# Patient Record
Sex: FEMALE | Race: WHITE
[De-identification: names, ages, dates, MRNs, and addresses within clinical notes are randomized per-mention and may not be internally consistent; named-entity substitution may affect disease eponyms.]

---

## 2017-01-23 ENCOUNTER — HOSPITAL ENCOUNTER (OUTPATIENT)
Dept: HOSPITAL 75 - RAD | Age: 62
End: 2017-01-23
Attending: NURSE PRACTITIONER
Payer: COMMERCIAL

## 2017-01-23 DIAGNOSIS — R10.33: Primary | ICD-10-CM

## 2017-01-23 PROCEDURE — 76705 ECHO EXAM OF ABDOMEN: CPT

## 2017-01-23 NOTE — XMS REPORT
Continuity of Care Document

 Created on: 2014



JUDIE CONKLIN

External Reference #: [id number]

: 1955

Sex: Female



Demographics







 Address  [address]

[place]  [address]

 

 Home Phone  [phone]

 

 Preferred Language  English

 

 Marital Status  Unknown

 

 Latter-day Affiliation  Unknown

 

 Race  Unknown

 

 Ethnic Group  Unknown





Author







 Author  [facility]

 

 Organization  [facility]

 

 Address  Unknown

 

 Phone  Unavailable







Support







 Name  Relationship  Address  Phone

 

 REJI PHILLIPS  Caregiver  [address] [place]  [address] [phone]

 

 TERRENCE TREVINO DO  Caregiver  [facility] EMERGENCY DEPT

[place]  [address] [phone]

 

 EBENEZER ROSAS MD  Caregiver  [address] [place]  [address] [phone]

 

 REGGIE MARTIN  Next Of Kin  [address]

[place]  [address] [phone]







Care Team Providers







 Care Team Member Name  Role  Phone

 

 EBENEZER ROSAS MD  PCP  [phone]







Insurance Providers







 Payer Name  Policy Number  Subscriber Name  Relationship

 

 Self Pay     Judie Conklin  18 Self / Same As Patient







Advance Directives







 Directive  Response  Recorded Date/Time

 

 Advance Directives  No  14 12:18pm

 

 Health Care Power of   No  14 12:18pm

 

 Organ Donor  No  14 12:18pm

 

 Resuscitation Status  Full Code  14 12:18pm







Problems

Medical Problems





 Problem  Onset Date  Status

 

 Lumbar radiculopathy  Unknown  Active







Medications







 Medication  Dose  Route  Sig  Days/Qty  Instructions  Order Date  Discontinued 
Date  Status

 

 Oxycodone HCl (Oxycodone IR)                 09  Discontinued

 

 Carisoprodol  350 Mg  PO  EVERY 4HRS PRN        09  
Discontinued

 

 Tolterodine Tartrate                 09  Discontinued

 

 Levothyroxine Sodium                 09  Discontinued

 

 Escitalopram Oxalate                 09  Discontinued

 

 Citalopram Hydrobromide  40 Mg  PO  DAILY        12  
Discontinued

 

 Solifenacin  10 Mg  PO  DAILY        12  Discontinued

 

 Hydrocodone Bit/Acetaminophen  7.5 - 500 Mg  PO  EVERY 4HRS PRN        13  Discontinued

 

 Hydrochlorothiazide  25 Mg  PO  DAILY        12     Active

 

 Meloxicam (Mobic)  15 Mg  PO  DAILY        12     Active

 

 Methylprednisolone  0  PO  AS DIRECTED  1 Qty     12  
Discontinued

 

 Tramadol HCl  50 Mg  PO  Q4-6HOURS PRN  20 Qty  FOR PAIN  12  
Discontinued

 

 Aclidinium Bromide  400 Mcg  IH  AS NEEDED        13  
Discontinued

 

 Gabapentin  300 Mg  PO  FOUR TIMES DAILY        13     Active

 

 Esomeprazole Magnesium  40 Mg  PO  DAILY        13     Active

 

 Black Cohosh Root Extract  80 Mg  PO  DAILY        13     Active

 

 Docusate Sodium  200 Mg  PO  DAILY        13     Active

 

 Cetirizine HCl  10 Mg  PO  DAILY        13     Active

 

 Escitalopram Oxalate  20 Mg  PO  DAILY        13     Active

 

 Bupropion HCl  150 Mg  PO  DAILY        13     Active

 

 Furosemide (Lasix)  40 Mg  PO  DAILY        13     Active

 

 Potassium Chloride  10 Meq  PO  DAILY WITH FOOD        13     Active

 

 [lipozene]  1,500 Mg  PO  DAILY        13  Discontinued

 

 Hydrocodone Bit/Acetaminophen  1 Each  PO  EVERY 4HRS PRN        13  Discontinued

 

 Dicyclomine HCl  1 Each  PO  BEFORE MEALS AND AT BEDTIME  20 Qty  FOR STOMACH 
DISCOMFORT  13  Discontinued

 

 Hyoscyamine Sulfate (Levsin)  1 - 2 Each  PO  Q4HR PRN  30 Qty     13  Discontinued

 

 Ondansetron HCl  4 Mg  PO  EVERY 4HRS  10 Qty  FOR NAUSEA AND VOMITING  13  Discontinued

 

 Solifenacin  10 Mg  PO  DAILY        13     Active

 

 Acetaminophen/Hydrocodone Bitart (Lortab)  7.5-500 Mg  PO  EVERY 4-6 HOURS PRN
        13     Active

 

 Aclidinium Bromide  400 Mcg  IH  DAILY PRN     AS NEEDED FOR SHORTNESS OF 
BREATH  13     Active

 

 Carisoprodol  350 Mg  PO  FOUR TIMES DAILY        13     Active

 

 Cefuroxime Axetil (Ceftin)  1 Each  PO  TWICE A DAY      TWICE DAILY  13  Discontinued

 

 Cyclobenzaprine Hcl  10 Mg  PO  EVERY 8HRS PRN SPASMS  10 Qty     14     
Active







Social History







 Social History Problem  Response  Recorded Date/Time

 

 Alcohol Use  Denies Use  2014 12:18pm

 

 Recreational Drug Use  No  2014 12:18pm

 

 Recent Foreign Travel  No  2014 12:18pm

 

 Recent Infectious Disease Exposure  No  2014 12:18pm

 

 Hospitalization with Isolation  Denies  2014 12:18pm

 

 Smoking Status  Current Everyday Smoker  2014 12:18pm









 Query  Response  Start Date  Stop Date

 

 Smoking Status  Current Everyday Smoker      







Hospital Discharge Instructions

No hospital discharge instructions.



Plan of Care

No plan of care.



Functional Status

No functional status results.



Allergies, Adverse Reactions, Alerts







 Allergen  Type  Severity  Reaction  Status  Last Updated

 

 tramadol HCl  Allergy  Unknown  RASH  Active  13

 

 diazepam ([id number])  Allergy  Unknown     Active  08

 

 morphine  Allergy  Intermediate  NAUSEA/VOMITTING/SYNCOPE  Active  12

 

 propoxyphene ([id number])  Allergy  Unknown     Active  08

 

 Pentazocine  Allergy  Mild     Active  09

 

 Aspirin  Allergy  Mild     Active  09

 

 methimazole ([id number])  Allergy  Unknown     Active  08

 

 prednisone ([id number])  Adverse Reaction        Active  13

 

 ketorolac ([id number])  Allergy  Mild     Active  09







Immunizations







 Name  Given  Type

 

 Hepatitis B  Yes  Historical

 

 Tetanus Booster (TDap)  Unknown  Historical







Vital Signs

Acute Vital Signs





 Vital  Response  Date/Time

 

 Temperature (Fahrenheit)  97.6 degrees F (97.6 - 99.5)   

 

 Temperature (Calculated Celsius)  36.85411 degrees C (36.4 - 37.5)   

 

 Temperature Source  Temporal     

 

 Pulse Rate (adult)  64 bpm (60 - 90)   

 

 Respiratory Rate  20 bpm (12 - 24)   

 

 O2 Sat by Pulse Oximetry  98 % (88 - 100)   

 

 Blood Pressure  146/102 mm Hg   

 

 Pain      

 

    Pain Intensity  10     

 

 Height (Feet)  5 feet    

 

 Height (Inches)  5 inches    

 

 Height (Calculated Centimeters)  165.812044 cm    

 

 Weight (Pounds)  176 pounds    

 

 Weight (Calculated Kilograms)  79.491857 kilograms    

 

 Calculated BMI  29.28     







Results







 Test  Source  Date  Result  Interp.  Ref. Range  Comments

 

 Alanine Aminotransferase (ALT/SGPT)     2013 5:40am  29 U/L  L  
30-65   

 

 Albumin     2013 5:40am  2.7 G/DL  L  3.4-5.0   

 

 Alkaline Phosphatase     2013 5:40am  108 U/L  N     

 

 Amylase Level     2013 5:40am  26 U/L  N     

 

 Aspartate Amino Transf (AST/SGOT)     2013 5:40am  10 U/L  L  15-
37   

 

 BUN/Creatinine Ratio     2013 5:40am  5     -   

 

 Band Neutrophils     2013 11:21am  0 %     -   

 

 Basophils # (Auto)     2013 5:40am  0.0 10^3/uL  N  0.0-0.1   

 

 Basophils % (Manual)     2013 11:21am  1 %     -   

 

 Basophils (%) (Auto)     2013 5:40am  0 %  N  0-10   

 

 Blood Urea Nitrogen     2013 5:40am  3 MG/DL  L  7-18   

 

 Calcium Level     2013 5:40am  8.7 MG/DL  N  8.5-10.1   

 

 Carbon Dioxide Level     2013 5:40am  25 MMOL/L  N  21-32   

 

 Chloride Level     2013 5:40am  102 MMOL/L  N  101-110   

 

 Creatine Kinase MB     2012 7:50am  0.2 NG/ML  N  0.0-3.6   

 

 Creatinine     2013 5:40am  0.6 MG/DL  N  0.6-1.3   

 

 Eosinophils # (Auto)     2013 5:40am  0.1 10^3/uL  N  0.0-0.3   

 

 Eosinophils % (Manual)     2013 1:01am  2 %     -   

 

 Eosinophils (%) (Auto)     2013 5:40am  1 %  N  0-10   

 

 Glucose Level     2013 5:40am  91 MG/DL  N     

 

 Hematocrit     2013 5:40am  32 %  L  35-52   

 

 Hemoglobin     2013 5:40am  10.7 G/DL  L  11.5-16.0   

 

 Hemoglobin A1c     2012 11:28am  5.4 %  N  4.5-6.2   

 

 Lipase     2013 5:40am  92 U/L  N     

 

 Lymphocytes # (Auto)     2013 5:40am  2.7 X 10^3  N  1.0-4.0   

 

 Lymphocytes % (Manual)     2013 1:01am  22 %     -   

 

 Lymphocytes (%) (Auto)     2013 5:40am  36 %  N  12-44   

 

 Magnesium Level     2008 12:30am  2.1 MG/DL  N  1.8-2.4  Has specimen 
been collected/obtained? YComments to Phlebotomist: Y

 

 Mean Corpuscular Hemoglobin     2013 5:40am  31 PG  N  25-34   

 

 Mean Corpuscular Hemoglobin Concent     2013 5:40am  33 G/DL  N  
32-36   

 

 Mean Corpuscular Volume     2013 5:40am  94 FL  N  80-99   

 

 Mean Platelet Volume     2013 5:40am  9.1 FL  N  7.4-10.4   

 

 Monocytes # (Auto)     2013 5:40am  0.6 X 10^3  N  0.0-1.0   

 

 Monocytes % (Manual)     2013 1:01am  2 %     -   

 

 Monocytes (%) (Auto)     2013 5:40am  8 %  N  0-12   

 

 Myoglobin     2008 12:30am  39 UG/L  N  10-92  Has specimen been 
collected/obtained? YComments to Phlebotomist: Y

 

 Neutrophils # (Auto)     2013 5:40am  4.0 X 10^3  N  1.8-7.8   

 

 Neutrophils % (Manual)     2013 1:01am  74 %     -   

 

 Neutrophils (%) (Auto)     2013 5:40am  54 %  N  42-75   

 

 Platelet Count     2013 5:40am  480 10^3/uL  H  130-400   

 

 Potassium Level     2013 5:40am  3.1 MMOL/L  L  3.6-5.0   

 

 Prothromb Time International Ratio     2012 11:15am  0.8  N  0.8-
1.4                       INTERPRETIVE DATASUGGESTED THERAPEUTIC RANGE FOR INR'S
:

   VENOUS THROMBOSIS, PULMONARY EMBOLISM, OR PREVENTION OF

   SYSTEMIC EMBOLISM (EG. IN ATRIAL FIBRILLATION):

                     2.0  -  3.0

   MECHANICAL PROSTHETIC HEART VALVES:

                     2.5  -  3.5*

*NOTE:  INR'S UP TO 4.5 MAY BE NECESSARY IN SELECTED GROUPS

        OF HIGH RISK PATIENTS.

[employer] CON[employer]ENS[employer]

CONFERENCE ON ANTITHROMBOTIC THERAPY (2000).

 

 Prothrombin Time     2012 11:15am  11.2 SEC  L  12.2-14.7   

 

 Red Blood Count     2013 5:40am  3.44 10^6/uL  L  4.35-5.85   

 

 Red Cell Distribution Width     2013 5:40am  13.1 %  N  10.0-
14.5   

 

 Sodium Level     2013 5:40am  137 MMOL/L  N  135-145   

 

 Thyroid Stimulating Hormone (TSH)     2013 11:21am  4.42 UIU/ML  N  
0.34-5.60   

 

 Total Bilirubin     2013 5:40am  0.2 MG/DL  N  0.0-1.0   

 

 Total Creatine Kinase     2012 7:50am  51 U/L  N  1-159   

 

 Total Protein     2013 5:40am  6.3 G/DL  L  6.4-8.2   

 

 Troponin I     2012 7:50am  < 0.10 NG/ML     0.00-0.10   

 

 Urine Bacteria     2013 1:54am  TRACE /HPF     -  Has specimen 
been collected/obtained? YSpecimen Description CLEAN CATCH

 

 Urine Bilirubin     2013 1:54am  NEGATIVE     -  Has specimen 
been collected/obtained? YSpecimen Description CLEAN CATCH

 

 Urine Casts     2013 1:54am  PRESENT /LPF  H  -  Has specimen 
been collected/obtained? YSpecimen Description CLEAN CATCH

 

 Urine Clarity     2013 1:54am  CLEAR     -  Has specimen been 
collected/obtained? YSpecimen Description CLEAN CATCH

 

 Urine Color     2013 1:54am  YELLOW     -  Has specimen been 
collected/obtained? YSpecimen Description CLEAN CATCH

 

 Urine Crystals     2013 1:54am  NONE /LPF     -  Has specimen 
been collected/obtained? YSpecimen Description CLEAN CATCH

 

 Urine Culture Indicated     2013 1:54am  YES     -  Has specimen 
been collected/obtained? YSpecimen Description CLEAN CATCH

 

 Urine Glucose (UA)     2013 1:54am  NEGATIVE     -  Has specimen 
been collected/obtained? YSpecimen Description CLEAN CATCH

 

 Urine Hyaline Casts     2013 1:54am  2-5 /LPF  H  -  Has 
specimen been collected/obtained? YSpecimen Description CLEAN CATCH

 

 Urine Ketones     2013 1:54am  NEGATIVE     -  Has specimen been 
collected/obtained? YSpecimen Description CLEAN CATCH

 

 Urine Leukocyte Esterase     2013 1:54am  2+  H  -  Has specimen 
been collected/obtained? YSpecimen Description CLEAN CATCH

 

 Urine Mucus     2013 1:54am  NEGATIVE /LPF     -  Has specimen 
been collected/obtained? YSpecimen Description CLEAN CATCH

 

 Urine Nitrite     2013 1:54am  NEGATIVE     -  Has specimen been 
collected/obtained? YSpecimen Description CLEAN CATCH

 

 Urine Protein     2013 1:54am  1+  H  -  Has specimen been 
collected/obtained? YSpecimen Description CLEAN CATCH

 

 Urine RBC     2013 1:54am  NONE /HPF     -  Has specimen been 
collected/obtained? YSpecimen Description CLEAN CATCH

 

 Urine Specific Gravity     2013 1:54am  1.015  L  -  Has 
specimen been collected/obtained? YSpecimen Description CLEAN CATCH

 

 Urine Squamous Epithelial Cells     2013 1:54am  RARE /HPF     -
  Has specimen been collected/obtained? YSpecimen Description CLEAN CATCH

 

 Urine Urobilinogen     2013 1:54am  NORMAL MG/DL     -  Has 
specimen been collected/obtained? YSpecimen Description CLEAN CATCH

 

 Urine WBC     2013 1:54am  10-25 /HPF  H  -  Has specimen been 
collected/obtained? YSpecimen Description CLEAN CATCH

 

 Urine pH     2013 1:54am  5     -  Has specimen been collected/
obtained? YSpecimen Description CLEAN CATCH

 

 White Blood Count     2013 5:40am  7.4 10^3/uL  N  4.3-11.0   

 

 Pathology Consult Specimen     2008 12:30am  See report     -  Has 
specimen been collected/obtained? YComments to Phlebotomist: Y

 

 Lab Scanned Report     2008 10:55am  Referred Lab Report [id number]     
-   

 

 Estimat Glomerular Filtration Rate     2013 1:01am  > 60     -  
                  GFR INTERPRETIVE DATA

         UNITS FOR ESTIMATED GFR (eGFR): mL/min/1.73 M2

         REFERENCE RANGE FOR ESTIMATED GFR (eGFR)

                                          eGFR

         NORMAL eGFR                       >60

         MODERATELY DECREASED eGFR          30-59

         SEVERLY DECREASED eGFR             15-29

         KIDNEY FAILURE                    <15 (OR DIALYSIS)

 

 Blood Morphology Comment     2013 1:01am  NORMAL     -   

 

 Creatine Kinase     2008 12:55pm  48 mg/dl  N    Has specimen 
been collected/obtained? Y

 

 Urine RBC (Auto)     2013 1:54am  NEGATIVE     -  Has specimen 
been collected/obtained? YSpecimen Description CLEAN CATCH

 

 Urine Culture  Urine-Clean Catch  2013 1:54am  Group B 
Streptococci         







Procedures

No known history of procedures.



Encounters







 Encounter  Location  Date/Time

 

 Registered Emergency Room  [facility]  14 12:08pm











 Recent Diagnosis

## 2017-01-23 NOTE — DIAGNOSTIC IMAGING REPORT
PROCEDURE: US Abdomen, limited.



TECHNIQUE: Multiple realtime grayscale images were obtained over

the abdomen in various projections.



INDICATION: Pain superior to umbilicus.



FINDINGS: There are no previous ultrasound examinations

available for comparison. The CT of the abdomen and pelvis exam

performed on 08/03/2016 noted a small fat-containing

retro-umbilical hernia. There is also thinning of the anterior

abdominal wall near midline just superior to the umbilicus. On

this study, there does appear to be a 3.2 x 1.5 cm area of

altered signal along the anterior abdominal wall 4 cm superior

and 2 cm to the right of the umbilicus. Whether this is

secondary to a small portion of the bowel extending through a

defect in the wall or whether this is related to mesenteric fat

alone is not certain. If further study is desired, then a repeat

CT abdomen/pelvis exam would be recommended.



IMPRESSION:



There is a small defect in the anterior abdominal wall just

superior and to the right of the umbilicus. There may either be

a segment of bowel extending through the defect or portion of

the mesenteric fat. CT of the abdomen and pelvis would be

recommended for further evaluation.



Dictated by:



Dictated on workstation # [id number]

## 2017-01-27 ENCOUNTER — HOSPITAL ENCOUNTER (OUTPATIENT)
Dept: HOSPITAL 75 - RAD | Age: 62
End: 2017-01-27
Attending: NURSE PRACTITIONER
Payer: COMMERCIAL

## 2017-01-27 DIAGNOSIS — K43.2: Primary | ICD-10-CM

## 2017-01-27 PROCEDURE — 74176 CT ABD & PELVIS W/O CONTRAST: CPT

## 2017-01-27 NOTE — XMS REPORT
Continuity of Care Document

 Created on: 2014



JUDIE CONKLIN

External Reference #: [id number]

: 1955

Sex: Female



Demographics







 Address  [address]

[place]  [address]

 

 Home Phone  [phone]

 

 Preferred Language  English

 

 Marital Status  Unknown

 

 Church Affiliation  Unknown

 

 Race  Unknown

 

 Ethnic Group  Unknown





Author







 Author  [facility]

 

 Organization  [facility]

 

 Address  Unknown

 

 Phone  Unavailable







Support







 Name  Relationship  Address  Phone

 

 REJI PHILLIPS  Caregiver  [address] [place]  [address] [phone]

 

 TERRENCE TREVINO DO  Caregiver  [facility] EMERGENCY DEPT

[place]  [address] [phone]

 

 EBENEZER ROSAS MD  Caregiver  [address] [place]  [address] [phone]

 

 REGGIE MARTIN  Next Of Kin  [address]

[place]  [address] [phone]







Care Team Providers







 Care Team Member Name  Role  Phone

 

 EBENEZER ROSAS MD  PCP  [phone]







Insurance Providers







 Payer Name  Policy Number  Subscriber Name  Relationship

 

 Self Pay     Judie Conklin  18 Self / Same As Patient







Advance Directives







 Directive  Response  Recorded Date/Time

 

 Advance Directives  No  14 12:18pm

 

 Health Care Power of   No  14 12:18pm

 

 Organ Donor  No  14 12:18pm

 

 Resuscitation Status  Full Code  14 12:18pm







Problems

Medical Problems





 Problem  Onset Date  Status

 

 Lumbar radiculopathy  Unknown  Active







Medications







 Medication  Dose  Route  Sig  Days/Qty  Instructions  Order Date  Discontinued 
Date  Status

 

 Oxycodone HCl (Oxycodone IR)                 09  Discontinued

 

 Carisoprodol  350 Mg  PO  EVERY 4HRS PRN        09  
Discontinued

 

 Tolterodine Tartrate                 09  Discontinued

 

 Levothyroxine Sodium                 09  Discontinued

 

 Escitalopram Oxalate                 09  Discontinued

 

 Citalopram Hydrobromide  40 Mg  PO  DAILY        12  
Discontinued

 

 Solifenacin  10 Mg  PO  DAILY        12  Discontinued

 

 Hydrocodone Bit/Acetaminophen  7.5 - 500 Mg  PO  EVERY 4HRS PRN        13  Discontinued

 

 Hydrochlorothiazide  25 Mg  PO  DAILY        12     Active

 

 Meloxicam (Mobic)  15 Mg  PO  DAILY        12     Active

 

 Methylprednisolone  0  PO  AS DIRECTED  1 Qty     12  
Discontinued

 

 Tramadol HCl  50 Mg  PO  Q4-6HOURS PRN  20 Qty  FOR PAIN  12  
Discontinued

 

 Aclidinium Bromide  400 Mcg  IH  AS NEEDED        13  
Discontinued

 

 Gabapentin  300 Mg  PO  FOUR TIMES DAILY        13     Active

 

 Esomeprazole Magnesium  40 Mg  PO  DAILY        13     Active

 

 Black Cohosh Root Extract  80 Mg  PO  DAILY        13     Active

 

 Docusate Sodium  200 Mg  PO  DAILY        13     Active

 

 Cetirizine HCl  10 Mg  PO  DAILY        13     Active

 

 Escitalopram Oxalate  20 Mg  PO  DAILY        13     Active

 

 Bupropion HCl  150 Mg  PO  DAILY        13     Active

 

 Furosemide (Lasix)  40 Mg  PO  DAILY        13     Active

 

 Potassium Chloride  10 Meq  PO  DAILY WITH FOOD        13     Active

 

 [lipozene]  1,500 Mg  PO  DAILY        13  Discontinued

 

 Hydrocodone Bit/Acetaminophen  1 Each  PO  EVERY 4HRS PRN        13  Discontinued

 

 Dicyclomine HCl  1 Each  PO  BEFORE MEALS AND AT BEDTIME  20 Qty  FOR STOMACH 
DISCOMFORT  13  Discontinued

 

 Hyoscyamine Sulfate (Levsin)  1 - 2 Each  PO  Q4HR PRN  30 Qty     13  Discontinued

 

 Ondansetron HCl  4 Mg  PO  EVERY 4HRS  10 Qty  FOR NAUSEA AND VOMITING  13  Discontinued

 

 Solifenacin  10 Mg  PO  DAILY        13     Active

 

 Acetaminophen/Hydrocodone Bitart (Lortab)  7.5-500 Mg  PO  EVERY 4-6 HOURS PRN
        13     Active

 

 Aclidinium Bromide  400 Mcg  IH  DAILY PRN     AS NEEDED FOR SHORTNESS OF 
BREATH  13     Active

 

 Carisoprodol  350 Mg  PO  FOUR TIMES DAILY        13     Active

 

 Cefuroxime Axetil (Ceftin)  1 Each  PO  TWICE A DAY      TWICE DAILY  13  Discontinued

 

 Cyclobenzaprine Hcl  10 Mg  PO  EVERY 8HRS PRN SPASMS  10 Qty     14     
Active







Social History







 Social History Problem  Response  Recorded Date/Time

 

 Alcohol Use  Denies Use  2014 12:18pm

 

 Recreational Drug Use  No  2014 12:18pm

 

 Recent Foreign Travel  No  2014 12:18pm

 

 Recent Infectious Disease Exposure  No  2014 12:18pm

 

 Hospitalization with Isolation  Denies  2014 12:18pm

 

 Smoking Status  Current Everyday Smoker  2014 12:18pm









 Query  Response  Start Date  Stop Date

 

 Smoking Status  Current Everyday Smoker      







Hospital Discharge Instructions

No hospital discharge instructions.



Plan of Care

No plan of care.



Functional Status

No functional status results.



Allergies, Adverse Reactions, Alerts







 Allergen  Type  Severity  Reaction  Status  Last Updated

 

 tramadol HCl  Allergy  Unknown  RASH  Active  13

 

 diazepam (I816879760)  Allergy  Unknown     Active  08

 

 morphine  Allergy  Intermediate  NAUSEA/VOMITTING/SYNCOPE  Active  12

 

 propoxyphene (L523946083)  Allergy  Unknown     Active  08

 

 Pentazocine  Allergy  Mild     Active  09

 

 Aspirin  Allergy  Mild     Active  09

 

 methimazole ([id number])  Allergy  Unknown     Active  08

 

 prednisone ([id number])  Adverse Reaction        Active  13

 

 ketorolac ([id number])  Allergy  Mild     Active  09







Immunizations







 Name  Given  Type

 

 Hepatitis B  Yes  Historical

 

 Tetanus Booster (TDap)  Unknown  Historical







Vital Signs

Acute Vital Signs





 Vital  Response  Date/Time

 

 Temperature (Fahrenheit)  97.6 degrees F (97.6 - 99.5)   

 

 Temperature (Calculated Celsius)  36.88180 degrees C (36.4 - 37.5)   

 

 Temperature Source  Temporal     

 

 Pulse Rate (adult)  64 bpm (60 - 90)   

 

 Respiratory Rate  20 bpm (12 - 24)   

 

 O2 Sat by Pulse Oximetry  98 % (88 - 100)   

 

 Blood Pressure  146/102 mm Hg   

 

 Pain      

 

    Pain Intensity  10     

 

 Height (Feet)  5 feet    

 

 Height (Inches)  5 inches    

 

 Height (Calculated Centimeters)  165.074905 cm    

 

 Weight (Pounds)  176 pounds    

 

 Weight (Calculated Kilograms)  79.300902 kilograms    

 

 Calculated BMI  29.28     







Results







 Test  Source  Date  Result  Interp.  Ref. Range  Comments

 

 Alanine Aminotransferase (ALT/SGPT)     2013 5:40am  29 U/L  L  
30-65   

 

 Albumin     2013 5:40am  2.7 G/DL  L  3.4-5.0   

 

 Alkaline Phosphatase     2013 5:40am  108 U/L  N     

 

 Amylase Level     2013 5:40am  26 U/L  N     

 

 Aspartate Amino Transf (AST/SGOT)     2013 5:40am  10 U/L  L  15-
37   

 

 BUN/Creatinine Ratio     2013 5:40am  5     -   

 

 Band Neutrophils     2013 11:21am  0 %     -   

 

 Basophils # (Auto)     2013 5:40am  0.0 10^3/uL  N  0.0-0.1   

 

 Basophils % (Manual)     2013 11:21am  1 %     -   

 

 Basophils (%) (Auto)     2013 5:40am  0 %  N  0-10   

 

 Blood Urea Nitrogen     2013 5:40am  3 MG/DL  L  7-18   

 

 Calcium Level     2013 5:40am  8.7 MG/DL  N  8.5-10.1   

 

 Carbon Dioxide Level     2013 5:40am  25 MMOL/L  N  21-32   

 

 Chloride Level     2013 5:40am  102 MMOL/L  N  101-110   

 

 Creatine Kinase MB     2012 7:50am  0.2 NG/ML  N  0.0-3.6   

 

 Creatinine     2013 5:40am  0.6 MG/DL  N  0.6-1.3   

 

 Eosinophils # (Auto)     2013 5:40am  0.1 10^3/uL  N  0.0-0.3   

 

 Eosinophils % (Manual)     2013 1:01am  2 %     -   

 

 Eosinophils (%) (Auto)     2013 5:40am  1 %  N  0-10   

 

 Glucose Level     2013 5:40am  91 MG/DL  N     

 

 Hematocrit     2013 5:40am  32 %  L  35-52   

 

 Hemoglobin     2013 5:40am  10.7 G/DL  L  11.5-16.0   

 

 Hemoglobin A1c     2012 11:28am  5.4 %  N  4.5-6.2   

 

 Lipase     2013 5:40am  92 U/L  N     

 

 Lymphocytes # (Auto)     2013 5:40am  2.7 X 10^3  N  1.0-4.0   

 

 Lymphocytes % (Manual)     2013 1:01am  22 %     -   

 

 Lymphocytes (%) (Auto)     2013 5:40am  36 %  N  12-44   

 

 Magnesium Level     2008 12:30am  2.1 MG/DL  N  1.8-2.4  Has specimen 
been collected/obtained? YComments to Phlebotomist: Y

 

 Mean Corpuscular Hemoglobin     2013 5:40am  31 PG  N  25-34   

 

 Mean Corpuscular Hemoglobin Concent     2013 5:40am  33 G/DL  N  
32-36   

 

 Mean Corpuscular Volume     2013 5:40am  94 FL  N  80-99   

 

 Mean Platelet Volume     2013 5:40am  9.1 FL  N  7.4-10.4   

 

 Monocytes # (Auto)     2013 5:40am  0.6 X 10^3  N  0.0-1.0   

 

 Monocytes % (Manual)     2013 1:01am  2 %     -   

 

 Monocytes (%) (Auto)     2013 5:40am  8 %  N  0-12   

 

 Myoglobin     2008 12:30am  39 UG/L  N  10-92  Has specimen been 
collected/obtained? YComments to Phlebotomist: Y

 

 Neutrophils # (Auto)     2013 5:40am  4.0 X 10^3  N  1.8-7.8   

 

 Neutrophils % (Manual)     2013 1:01am  74 %     -   

 

 Neutrophils (%) (Auto)     2013 5:40am  54 %  N  42-75   

 

 Platelet Count     2013 5:40am  480 10^3/uL  H  130-400   

 

 Potassium Level     2013 5:40am  3.1 MMOL/L  L  3.6-5.0   

 

 Prothromb Time International Ratio     2012 11:15am  0.8  N  0.8-
1.4                       INTERPRETIVE DATASUGGESTED THERAPEUTIC RANGE FOR INR'S
:

   VENOUS THROMBOSIS, PULMONARY EMBOLISM, OR PREVENTION OF

   SYSTEMIC EMBOLISM (EG. IN ATRIAL FIBRILLATION):

                     2.0  -  3.0

   MECHANICAL PROSTHETIC HEART VALVES:

                     2.5  -  3.5*

*NOTE:  INR'S UP TO 4.5 MAY BE NECESSARY IN SELECTED GROUPS

        OF HIGH RISK PATIENTS.

[employer] CONSENSUS

CONFERENCE ON ANTITHROMBOTIC THERAPY (2000).

 

 Prothrombin Time     2012 11:15am  11.2 SEC  L  12.2-14.7   

 

 Red Blood Count     2013 5:40am  3.44 10^6/uL  L  4.35-5.85   

 

 Red Cell Distribution Width     2013 5:40am  13.1 %  N  10.0-
14.5   

 

 Sodium Level     2013 5:40am  137 MMOL/L  N  135-145   

 

 Thyroid Stimulating Hormone (TSH)     2013 11:21am  4.42 UIU/ML  N  
0.34-5.60   

 

 Total Bilirubin     2013 5:40am  0.2 MG/DL  N  0.0-1.0   

 

 Total Creatine Kinase     2012 7:50am  51 U/L  N  1-159   

 

 Total Protein     2013 5:40am  6.3 G/DL  L  6.4-8.2   

 

 Troponin I     2012 7:50am  < 0.10 NG/ML     0.00-0.10   

 

 Urine Bacteria     2013 1:54am  TRACE /HPF     -  Has specimen 
been collected/obtained? YSpecimen Description CLEAN CATCH

 

 Urine Bilirubin     2013 1:54am  NEGATIVE     -  Has specimen 
been collected/obtained? YSpecimen Description CLEAN CATCH

 

 Urine Casts     2013 1:54am  PRESENT /LPF  H  -  Has specimen 
been collected/obtained? YSpecimen Description CLEAN CATCH

 

 Urine Clarity     2013 1:54am  CLEAR     -  Has specimen been 
collected/obtained? YSpecimen Description CLEAN CATCH

 

 Urine Color     2013 1:54am  YELLOW     -  Has specimen been 
collected/obtained? YSpecimen Description CLEAN CATCH

 

 Urine Crystals     2013 1:54am  NONE /LPF     -  Has specimen 
been collected/obtained? YSpecimen Description CLEAN CATCH

 

 Urine Culture Indicated     2013 1:54am  YES     -  Has specimen 
been collected/obtained? YSpecimen Description CLEAN CATCH

 

 Urine Glucose (UA)     2013 1:54am  NEGATIVE     -  Has specimen 
been collected/obtained? YSpecimen Description CLEAN CATCH

 

 Urine Hyaline Casts     2013 1:54am  2-5 /LPF  H  -  Has 
specimen been collected/obtained? YSpecimen Description CLEAN CATCH

 

 Urine Ketones     2013 1:54am  NEGATIVE     -  Has specimen been 
collected/obtained? YSpecimen Description CLEAN CATCH

 

 Urine Leukocyte Esterase     2013 1:54am  2+  H  -  Has specimen 
been collected/obtained? YSpecimen Description CLEAN CATCH

 

 Urine Mucus     2013 1:54am  NEGATIVE /LPF     -  Has specimen 
been collected/obtained? YSpecimen Description CLEAN CATCH

 

 Urine Nitrite     2013 1:54am  NEGATIVE     -  Has specimen been 
collected/obtained? YSpecimen Description CLEAN CATCH

 

 Urine Protein     2013 1:54am  1+  H  -  Has specimen been 
collected/obtained? YSpecimen Description CLEAN CATCH

 

 Urine RBC     2013 1:54am  NONE /HPF     -  Has specimen been 
collected/obtained? YSpecimen Description CLEAN CATCH

 

 Urine Specific Gravity     2013 1:54am  1.015  L  -  Has 
specimen been collected/obtained? YSpecimen Description CLEAN CATCH

 

 Urine Squamous Epithelial Cells     2013 1:54am  RARE /HPF     -
  Has specimen been collected/obtained? YSpecimen Description CLEAN CATCH

 

 Urine Urobilinogen     2013 1:54am  NORMAL MG/DL     -  Has 
specimen been collected/obtained? YSpecimen Description CLEAN CATCH

 

 Urine WBC     2013 1:54am  10-25 /HPF  H  -  Has specimen been 
collected/obtained? YSpecimen Description CLEAN CATCH

 

 Urine pH     2013 1:54am  5     -  Has specimen been collected/
obtained? YSpecimen Description CLEAN CATCH

 

 White Blood Count     2013 5:40am  7.4 10^3/uL  N  4.3-11.0   

 

 Pathology Consult Specimen     2008 12:30am  See report     -  Has 
specimen been collected/obtained? YComments to Phlebotomist: Y

 

 Lab Scanned Report     2008 10:55am  Referred Lab Report [id number]     
-   

 

 Estimat Glomerular Filtration Rate     2013 1:01am  > 60     -  
                  GFR INTERPRETIVE DATA

         UNITS FOR ESTIMATED GFR (eGFR): mL/min/1.73 M2

         REFERENCE RANGE FOR ESTIMATED GFR (eGFR)

                                          eGFR

         NORMAL eGFR                       >60

         MODERATELY DECREASED eGFR          30-59

         SEVERLY DECREASED eGFR             15-29

         KIDNEY FAILURE                    <15 (OR DIALYSIS)

 

 Blood Morphology Comment     2013 1:01am  NORMAL     -   

 

 Creatine Kinase     2008 12:55pm  48 mg/dl  N    Has specimen 
been collected/obtained? Y

 

 Urine RBC (Auto)     2013 1:54am  NEGATIVE     -  Has specimen 
been collected/obtained? YSpecimen Description CLEAN CATCH

 

 Urine Culture  Urine-Clean Catch  2013 1:54am  Group B 
Streptococci         







Procedures

No known history of procedures.



Encounters







 Encounter  Location  Date/Time

 

 Registered Emergency Room  [facility]  14 12:08pm











 Recent Diagnosis

## 2017-01-27 NOTE — DIAGNOSTIC IMAGING REPORT
PROCEDURE: CT abdomen and pelvis without contrast.



TECHNIQUE: Multiple contiguous axial images were obtained

through the abdomen and pelvis without the use of intravenous

contrast.



INDICATION: History of partial gastric resection for bleeding

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 Pain at incisional site in midabdomen. History

of hernia, gallbladder, appendectomy and hysterectomy.



FINDINGS: Midline incision is noted. There is a small incisional

ventral hernia present. This contains abdominal fat with no

bowel. This is along the superior margin of the incision. There

is also noted infiltrating appearance to the mesenteric fat

along the inferior margin of the incision left of midline. The

adjacent small bowel appears normal. There are no loculated

fluid collections. The stomach shows antrectomy. Stomach is

filled with fluid. There is fluid in the duodenum and the small

bowel. No evidence of bowel obstruction. The colon is

fluid-filled with very little solid stool present. No evidence

of diverticulitis. There is no free air or free fluid. The

bladder is empty. No intra-abdominal adenopathy. The aorta is

atherosclerotic without evidence of aneurysm. The liver appears

normal. The gallbladder is absent. Bile ducts are not dilated.

The pancreas and spleen appear normal. The adrenal glands are

normal. The kidneys appear normal without obstruction or

calculi.



IMPRESSION:

1. There is a small incisional hernia along the midline in the

upper region of the previous abdominal incision.

2. There is infiltrative appearance to the mesenteric fat in the

midabdomen left of midline. This is of uncertain etiology. This

is at the level of the umbilicus. With patient's symptoms, this

may represent an inflammatory process. There are no loculated

fluid collections.

3. The stomach, small bowel, and colon are fluid-filled with no

evidence of obstruction. There is very little solid stool

present.



Dictated by:



Dictated on workstation # [id number]

## 2017-01-28 ENCOUNTER — HOSPITAL ENCOUNTER (EMERGENCY)
Dept: HOSPITAL 75 - ER | Age: 62
Discharge: HOME | End: 2017-01-28
Payer: COMMERCIAL

## 2017-01-28 VITALS — SYSTOLIC BLOOD PRESSURE: 103 MMHG | DIASTOLIC BLOOD PRESSURE: 68 MMHG

## 2017-01-28 VITALS — WEIGHT: 155 LBS | BODY MASS INDEX: 25.83 KG/M2 | HEIGHT: 65 IN

## 2017-01-28 DIAGNOSIS — F17.210: ICD-10-CM

## 2017-01-28 DIAGNOSIS — N39.0: ICD-10-CM

## 2017-01-28 DIAGNOSIS — E87.8: Primary | ICD-10-CM

## 2017-01-28 DIAGNOSIS — I10: ICD-10-CM

## 2017-01-28 DIAGNOSIS — Z79.899: ICD-10-CM

## 2017-01-28 DIAGNOSIS — Z79.82: ICD-10-CM

## 2017-01-28 DIAGNOSIS — R11.2: ICD-10-CM

## 2017-01-28 LAB
ALBUMIN SERPL-MCNC: 4.5 G/DL (ref 3.2–4.5)
ALT SERPL-CCNC: 9 U/L (ref 0–55)
ANION GAP SERPL CALC-SCNC: 14 MMOL/L (ref 5–14)
AST SERPL-CCNC: 8 U/L (ref 5–34)
BASOPHILS # BLD AUTO: 0 10^3/UL (ref 0–0.1)
BASOPHILS NFR BLD AUTO: 0 % (ref 0–10)
BILIRUB SERPL-MCNC: 0.6 MG/DL (ref 0.1–1)
BILIRUB UR QL STRIP: (no result)
BUN SERPL-MCNC: 15 MG/DL (ref 7–18)
BUN/CREAT SERPL: 15
CALCIUM SERPL-MCNC: 9.3 MG/DL (ref 8.5–10.1)
CHLORIDE SERPL-SCNC: 101 MMOL/L (ref 98–107)
CO2 SERPL-SCNC: 22 MMOL/L (ref 21–32)
CREAT SERPL-MCNC: 1.03 MG/DL (ref 0.6–1.3)
EOSINOPHIL # BLD AUTO: 0.6 10^3/UL (ref 0–0.3)
EOSINOPHIL NFR BLD AUTO: 8 % (ref 0–10)
ERYTHROCYTE [DISTWIDTH] IN BLOOD BY AUTOMATED COUNT: 17.4 % (ref 10–14.5)
GFR SERPLBLD BASED ON 1.73 SQ M-ARVRAT: 55 ML/MIN
GLUCOSE SERPL-MCNC: 107 MG/DL (ref 70–105)
KETONES UR QL STRIP: NEGATIVE
LEUKOCYTE ESTERASE UR QL STRIP: (no result)
LIPASE SERPL-CCNC: 6 U/L (ref 8–78)
LYMPHOCYTES # BLD AUTO: 0.8 X 10^3 (ref 1–4)
LYMPHOCYTES NFR BLD AUTO: 10 % (ref 12–44)
MCH RBC QN AUTO: 26 PG (ref 25–34)
MCHC RBC AUTO-ENTMCNC: 33 G/DL (ref 32–36)
MCV RBC AUTO: 80 FL (ref 80–99)
MONOCYTES # BLD AUTO: 0.7 X 10^3 (ref 0–1)
MONOCYTES NFR BLD AUTO: 9 % (ref 0–12)
NEUTROPHILS # BLD AUTO: 5.7 X 10^3 (ref 1.8–7.8)
NEUTROPHILS NFR BLD AUTO: 73 % (ref 42–75)
NITRITE UR QL STRIP: NEGATIVE
PH UR STRIP: 6 [PH] (ref 5–9)
PLATELET # BLD: 458 10^3/UL (ref 130–400)
PMV BLD AUTO: 9.5 FL (ref 7.4–10.4)
POTASSIUM SERPL-SCNC: 2.9 MMOL/L (ref 3.6–5)
PROT SERPL-MCNC: 7.3 G/DL (ref 6.4–8.2)
PROT UR QL STRIP: (no result)
RBC # BLD AUTO: 4.83 10^6/UL (ref 4.35–5.85)
SODIUM SERPL-SCNC: 137 MMOL/L (ref 135–145)
SP GR UR STRIP: 1.01 (ref 1.02–1.02)
SQUAMOUS #/AREA URNS HPF: (no result) /HPF
UROBILINOGEN UR-MCNC: NORMAL MG/DL
WBC # BLD AUTO: 7.7 10^3/UL (ref 4.3–11)
WBC #/AREA URNS HPF: (no result) /HPF

## 2017-01-28 PROCEDURE — 96361 HYDRATE IV INFUSION ADD-ON: CPT

## 2017-01-28 PROCEDURE — 83690 ASSAY OF LIPASE: CPT

## 2017-01-28 PROCEDURE — 80053 COMPREHEN METABOLIC PANEL: CPT

## 2017-01-28 PROCEDURE — 96375 TX/PRO/DX INJ NEW DRUG ADDON: CPT

## 2017-01-28 PROCEDURE — 81000 URINALYSIS NONAUTO W/SCOPE: CPT

## 2017-01-28 PROCEDURE — 85025 COMPLETE CBC W/AUTO DIFF WBC: CPT

## 2017-01-28 PROCEDURE — 87088 URINE BACTERIA CULTURE: CPT

## 2017-01-28 PROCEDURE — 96374 THER/PROPH/DIAG INJ IV PUSH: CPT

## 2017-01-28 PROCEDURE — 36415 COLL VENOUS BLD VENIPUNCTURE: CPT

## 2017-01-28 NOTE — ED ABDOMINAL PAIN
General


Chief Complaint:  Abdominal/GI Problems


Stated Complaint:  VOMITING/DIARRHEA


Nursing Triage Note:  


AMB TO ED C/O VOMITING DIARRHEA SAW HER DR ON MONDAY MARIO ORDERED AND HAD OP CT 


YESTERDAY NO RESULTS KNOWN.


Sepsis Screen:  No Definite Risk


Source of Information:  Patient


Exam Limitations:  No Limitations





History of Present Illness


Time Seen By Provider:  16:22


Initial Comments


To ER with midline upper abdominal pain for the past 3 days.  She had 

outpatient abdomen ultrasound performed followed by outpatient abdomen pelvis 

CT scan.  These were done yesterday.  The CT scan showed some nonspecific 

inflammation to the left of midline in the mesentery.  She denies fevers.  She 

does report large amounts of watery diarrhea without blood or mucus.  She does 

report vomiting.


Timing/Duration:  1-2 Days


Severity/Quality:  Moderate


Location:  Epigastric


Radiation:  No Radiation


Activities at Onset:  None


Associated Symptoms:   Nausea/Vomiting





Allergies and Home Medications


Allergies


Coded Allergies:  


     morphine (Verified  Allergy, Intermediate, NAUSEA/VOMITTING/SYNCOPE, 5/30/ 12)


     aspirin (Unverified  Allergy, Mild, 5/28/09)


     ketorolac (Unverified  Allergy, Mild, 5/28/09)


     pentazocine (Unverified  Allergy, Mild, 5/28/09)


     diazepam (Verified  Allergy, Unknown, 6/22/08)


     duloxetine (Unverified  Allergy, Unknown, HIVES AND BEHAVIOR EPISODES, 10/7

/14)


     methimazole (Verified  Allergy, Unknown, 6/22/08)


     nalbuphine (Unverified  Allergy, Unknown, HIVES, 10/7/14)


     propoxyphene (Verified  Allergy, Unknown, 6/22/08)


     topiramate (Unverified  Allergy, Unknown, MOUTH SWELLING AND SORES, 10/7/14

)


     tramadol HCl (Unverified  Allergy, Unknown, RASH, 9/3/13)


     prednisone (Unverified  Adverse Reaction, Unknown, 10/7/14)


 seizure





Home Medications


Alprazolam 0.5 Mg Tablet 0.5 MG PO TID PRN PRN ANXIETY (Reported) 


   LAST FILLED #30 11-23-15 


Aspirin/Acetaminophen/Caffeine 1 Tab Tablet 1 TAB PO BID PRN PRN HEADACHE (

Reported) 


Black Cohosh Root Extract 80 Mg Capsule 80 MG PO DAILY (Reported) 


Bupropion HCl 150 Mg Tablet.er 150 MG PO DAILY (Reported) 


Cetirizine Hcl 10 Mg Tablet 10 MG PO DAILY (Reported) 


Ciprofloxacin HCl 500 Mg Tablet #14 500 MG PO BID 


   Prescribed by: ABISAI MOORE on 8/16/16 1152


Ciprofloxacin HCl 500 Mg Tablet #10 500 MG PO BID 


   Prescribed by: JEAN PAUL JARRELL on 1/28/17 1712


Cranberry Conc/Ascorbic Acid 1 Each Capsule 2 CAP PO DAILY (Reported) 


Docusate Sodium 100 Mg Capsule 200 MG PO DAILY PRN PRN CONSTIPATION (Reported) 


   TAKES 2 (100MG) CAPSULES DAILY 


Escitalopram Oxalate 20 Mg Tablet 20 MG PO DAILY (Reported) 


Gabapentin 300 Mg Capsule 600 MG PO DAILY (Reported) 


   TAKES 2 (300MG) CAPSULES 


Hydrochlorothiazide 25 Mg Tablet 25 MG PO DAILY (Reported) 


Hydrocodone/Acetaminophen 1 Each Tablet 1 TAB PO BID PRN PRN PAIN (Reported) 


Hydrocodone/Acetaminophen 1 Each Tablet #40 1-2 EACH PO Q4H 


   Prescribed by: ABISAI MOORE on 8/16/16 1152


Levothyroxine Sodium 25 Mcg Tablet #30 (Reported) 


Meloxicam 15 Mg Tablet 15 MG PO DAILY (Reported) 


Metaxalone 800 Mg Tablet 800 MG PO TID PRN PRN MUSCLE SPASMS (Reported) 


Ondansetron 8 Mg Tab.rapdis #10 8 MG PO Q6H PRN PRN NAUSEA/VOMITING 


   Prescribed by: JEAN PAUL JARRELL on 1/28/17 1712


Pantoprazole Sodium 40 Mg Tablet.dr #90 40 MG PO DAILY 


   Prescribed by: ABISAI MOORE on 8/16/16 1155


Potassium Chloride 10 Meq Tablet.er 20 MEQ PO DAILY (Reported) 


   TAKES 2 (10 MEQ) TABLETS 


Tolterodine Tartrate 2 Mg Tablet 4 MG PO DAILY (Reported) 


   TAKES 2 (2MG) TABLETS 


Valerian Root 100 Mg Capsule 100 MG PO HS PRN PRN SLEEP (Reported) 





Review of Systems


Constitutional:   see HPINo diaphoresis, No fever


EENTM:   No Symptoms Reported


Respiratory:   No Symptoms Reported


Cardiovascular:   No Symptoms Reported


Gastrointestinal:   See HPI Abdominal Pain Diarrhea Nausea Vomiting


Genitourinary:   No Symptoms Reported


Musculoskeletal:   no symptoms reported


Skin:   no symptoms reported


Psychiatric/Neurological:   No Symptoms Reported


Endocrine:   No Symptoms Reported


Hematologic/Lymphatic:   No Symptoms Reported





Past Medical-Social-Family Hx


Patient Social History


Alcohol Use:  Denies Use


Recreational Drug Use:  Yes (SMOKES 1/2 PPD AT MOST)


Smoking Status:  Current Someday Smoker


Recent Foreign Travel:  No


Contact w/Someone Who Travel:  No


Recent Infectious Disease Expo:  No


Recent Hopitalizations:  No


Physical Abuse Screen:  No


Sexual Abuse:  No





Immunizations Up To Date


Tetanus Booster (TDap):  Unknown


PED Vaccines UTD:  No





Seasonal Allergies


Seasonal Allergies:  No





Surgeries


HX Surgeries:  Yes (cyst removed from heart and lung when 13, both knees scoped

& reconstructed,)


Surgeries:  Appendectomy, Gallbladder, Hysterectomy, Orthopedic, Thyroidectomy





Respiratory


Hx Respiratory Disorders:  No





Cardiovascular


Hx Cardiac Disorders:  Yes (cyst removed from heart )


Cardiac Disorders:  Hypertension





Neurological


Hx Neurological Disorders:  No





Reproductive System


Hx Reproductive Disorders:  No





Genitourinary


Hx Genitourinary Disorders:  No





Gastrointestinal


Hx Gastrointestinal Disorders:  Yes


Gastrointestinal Disorders:  Gall Bladder Disease





Musculoskeletal


Hx Musculoskeletal Disorders:  Yes (lower back compression fracture)


Musculoskeletal Disorders:  Rheumatoid Arthritis





Endocrine


Hx Endocrine Disorders:  Yes (thyroid removal)


Endocrine Disorders:  Hypothyroidsim





HEENT


HX ENT Disorders:  Yes (Carotid salivary cyst removal left side)





Cancer


Hx Cancer:  No





Psychosocial


Hx Psychiatric Problems:  No


Behavioral Health Disorders:  Depression





Integumentary


HX Skin/Integumentary Disorder:  No





Blood Transfusions


Hx Blood Disorders:  No


Adverse Reaction to a Blood Tr:  No





Family Medical History


Significant Family History:  CAD Over 55 Years Old


Family Medial History:  


Arthritis


Cardiovascular disease


Diabetes mellitus


Hypertension


Respiratory disorder


Thyroid disease





No Family History of:


  AIDS


  Abdominal aortic aneurysm


  Cabarrus's disease


  Alcoholism


  Alzheimer's disease


  Aphasia


  Asthma


  Cancer of mouth


  Cataracts


  Colon cancer


  Completed stroke


  Congenital disease


  Congenital heart disease


  Coronary thrombosis


  Cystic fibrosis


  Deafness or hearing loss


  Dementia


  Drug abuse


  Dysphasia


  Fibrocystic disease of breast


  Gastroenteritis


  Glaucoma


  Headache disorder


  Hypercholesterolemia


  Infertility


  Kidney disease


  Myocardial infarction


  Neoplasm


  Not obtainable due to adoption


  Osteoporosis


  Parkinson's disease


  Prostate cancer


  Psychosocial problem


  Seizure disorder


  Severe allergy


  Tuberculosis


  Visual disorder





Physical Exam


Vital Signs





 VS - Last 72 Hours, by Label








 1/28/17





 15:51


 


Temp 98.6


 


Pulse 84


 


Resp 18


 


B/P 124/76


 


Pulse Ox 98


 


O2 Delivery Room Air





Capillary Refill : Less Than 3 Seconds


General Appearance:   WD/WN no apparent distress


HEENT:   PERRL/EOMI normal ENT inspection


Neck:   non-tender full range of motion


Respiratory:   no respiratory distress no accessory muscle use


Gastrointestinal:   normal bowel sounds soft tenderness


Extremities:   normal range of motion non-tender


Neurologic/Psychiatric:   alert normal mood/affect oriented x 3


Skin:   normal color warm/dry





Progress/Results/Core Measures


Results/Orders


Lab Results





 Laboratory Tests








Test


  1/28/17


16:05 1/28/17


16:17 Range/Units


 


 


Alanine Aminotransferase


(ALT/SGPT) 9 


  


  0-55  U/L


 


 


Albumin 4.5   3.2-4.5  G/DL


 


Alkaline Phosphatase 119     U/L


 


Anion Gap 14   5-14  MMOL/L


 


Aspartate Amino Transf


(AST/SGOT) 8 


  


  5-34  U/L


 


 


BUN/Creatinine Ratio 15    


 


Basophils # (Auto)


  0.0 


  


  0.0-0.1


10^3/uL


 


Basophils (%) (Auto) 0   0-10  %


 


Blood Urea Nitrogen 15   7-18  MG/DL


 


Calcium Level 9.3   8.5-10.1  MG/DL


 


Carbon Dioxide Level 22   21-32  MMOL/L


 


Chloride Level 101     MMOL/L


 


Creatinine


  1.03 


  


  0.60-1.30


MG/DL


 


Eosinophils # (Auto)


  0.6 H


  


  0.0-0.3


10^3/uL


 


Eosinophils (%) (Auto) 8   0-10  %


 


Estimat Glomerular Filtration


Rate 55 


  


   


 


 


Glucose Level 107 H    MG/DL


 


Hematocrit 39   35-52  %


 


Hemoglobin 12.6   11.5-16.0  G/DL


 


Lipase 6 L  8-78  U/L


 


Lymphocytes # (Auto) 0.8 L  1.0-4.0  X 10^3


 


Lymphocytes (%) (Auto) 10 L  12-44  %


 


Mean Corpuscular Hemoglobin 26   25-34  PG


 


Mean Corpuscular Hemoglobin


Concent 33 


  


  32-36  G/DL


 


 


Mean Corpuscular Volume 80   80-99  FL


 


Mean Platelet Volume 9.5   7.4-10.4  FL


 


Monocytes # (Auto) 0.7   0.0-1.0  X 10^3


 


Monocytes (%) (Auto) 9   0-12  %


 


Neutrophils # (Auto) 5.7   1.8-7.8  X 10^3


 


Neutrophils (%) (Auto) 73   42-75  %


 


Platelet Count


  458 H


  


  130-400


10^3/uL


 


Potassium Level 2.9 L  3.6-5.0  MMOL/L


 


Red Blood Count


  4.83 


  


  4.35-5.85


10^6/uL


 


Red Cell Distribution Width 17.4 H  10.0-14.5  %


 


Sodium Level 137   135-145  MMOL/L


 


Total Bilirubin 0.6   0.1-1.0  MG/DL


 


Total Protein 7.3   6.4-8.2  G/DL


 


White Blood Count


  7.7 


  


  4.3-11.0


10^3/uL


 


Urine Bacteria  TRACE   /HPF


 


Urine Bilirubin  1+ H NEGATIVE  


 


Urine Casts  NONE   /LPF


 


Urine Clarity  VERY CLOUDY H  


 


Urine Color  YELLOW   


 


Urine Crystals  NONE   /LPF


 


Urine Culture Indicated  YES   


 


Urine Glucose (UA)  NEGATIVE  NEGATIVE  


 


Urine Ketones  NEGATIVE  NEGATIVE  


 


Urine Leukocyte Esterase  2+ H NEGATIVE  


 


Urine Mucus  LARGE H  /LPF


 


Urine Nitrite  NEGATIVE  NEGATIVE  


 


Urine Protein  2+ H NEGATIVE  


 


Urine RBC  NONE   /HPF


 


Urine RBC (Auto)  1+ H NEGATIVE  


 


Urine Specific Gravity  1.010 L 1.016-1.022  


 


Urine Squamous Epithelial


Cells 


  5-10 


   /HPF


 


 


Urine Urobilinogen  NORMAL  NORMAL  MG/DL


 


Urine WBC  5-10 H  /HPF


 


Urine pH  6  5-9  








My Orders





 Orders-JEAN PAUL JARRELL


Cbc With Automated Diff (1/28/17 15:58)


Comprehensive Metabolic Panel (1/28/17 15:58)


Lipase (1/28/17 15:58)


Ua Culture If Indicated (1/28/17 15:58)


Saline Lock/Iv-Start (1/28/17 15:58)


Diphenhydramine Injection (Benadryl Inje (1/28/17 16:00)


Prochlorperazine Injection (Compazine In (1/28/17 16:00)


Ns Iv 1000 Ml (Sodium Chloride 0.9%) (1/28/17 16:00)


Antacid  Suspension (Mylanta  Suspension (1/28/17 16:30)


Lidocaine 2% Viscous 15 Ml (Xylocaine Vi (1/28/17 16:30)


Urine Culture (1/28/17 16:17)


Potassium Chloride Powder (Klor Con 20 M (1/28/17 17:00)





Medications Given in ED





 Current Medications








 Medications  Dose


 Ordered  Sig/Zeyad


 Route  Start Time


 Stop Time Status Last Admin


Dose Admin


 


 Al Hydrox/Mg


 Hydrox/Simethicone  30 ml  ONCE  ONCE


 PO  1/28/17 16:30


 1/28/17 16:31 DC 1/28/17 17:05


30 ML


 


 Diphenhydramine


 HCl  25 mg  ONCE  ONCE


 IVP  1/28/17 16:00


 1/28/17 16:01 DC 1/28/17 16:20


25 MG


 


 Lidocaine HCl  15 ml  ONCE  ONCE


 PO  1/28/17 16:30


 1/28/17 16:31 DC 1/28/17 17:05


15 ML


 


 Potassium Chloride  40 meq  ONCE  ONCE


 PO  1/28/17 17:00


 1/28/17 17:01 DC 1/28/17 17:04


40 MEQ


 


 Prochlorperazine


 Edisylate  5 mg  ONCE  ONCE


 IV  1/28/17 16:00


 1/28/17 16:01 DC 1/28/17 16:22


5 MG








Vital Signs/I&O





 Vital Sign - Last 12Hours








 1/28/17





 15:51


 


Temp 98.6


 


Pulse 84


 


Resp 18


 


B/P 124/76


 


Pulse Ox 98


 


O2 Delivery Room Air








Blood Pressure Mean:  92





Departure


Impression


Impression:  


 Primary Impression:  


 Electrolyte imbalance


 Additional Impressions:  


 Nausea and vomiting


 Qualified Code:  R11.2 - Nausea with vomiting, unspecified


 Urinary tract infection


 Qualified Code:  N30.00 - Acute cystitis without hematuria


Disposition:  01 HOME, SELF-CARE


Condition:  Stable





Departure-Patient Inst.


Decision time for Depature:  17:11


Referrals:  


[facility] (PCP)


Primary Care Physician








BENOIT MARTINI (Family)


Primary Care Physician


Patient Instructions:  Nausea and Vomiting, Adult





Add. Discharge Instructions:


1.  You may use over-the-counter Imodium for your diarrhea


2.  Return to ER for any concerns


3.  All discharge instructions reviewed with patient and/or family. Voiced 

understanding.


Scripts


Potassium Chloride 20 Meq Tablet.er40 Meq PO DAILY #6 TAB


   Prov:JEAN PAUL JARRELL APRN         1/28/17


Ondansetron (Zofran Odt)8 Mg Tab.rapdis8 Mg PO Q6H PRN NAUSEA/VOMITING #10 TAB


   Prov:JEAN PAUL JARRELL APRN         1/28/17


Ciprofloxacin HCl (Cipro)500 Mg [id number] Mg PO BID #10 TAB


   Prov:JEAN PAUL JARRELL APRN         1/28/17








JEAN PAUL JARRELL APRN Jan 28, 2017 16:24

## 2017-01-28 NOTE — XMS REPORT
Continuity of Care Document

 Created on: 2014



JUDIE CONKLIN

External Reference #: [id number]

: 1955

Sex: Female



Demographics







 Address  [address]

[place]  [address]

 

 Home Phone  [phone]

 

 Preferred Language  English

 

 Marital Status  Unknown

 

 Alevism Affiliation  Unknown

 

 Race  Unknown

 

 Ethnic Group  Unknown





Author







 Author  [facility]

 

 Organization  [facility]

 

 Address  Unknown

 

 Phone  Unavailable







Support







 Name  Relationship  Address  Phone

 

 REJI PHILLIPS  Caregiver  [address] [place]  [address] [phone]

 

 TERRENCE TREVINO DO  Caregiver  [facility] EMERGENCY DEPT

[place]  [address] [phone]

 

 EBENEZER ROSAS MD  Caregiver  [address] [place]  [address] [phone]

 

 REGGIE MARTIN  Next Of Kin  [address]

[place]  [address] [phone]







Care Team Providers







 Care Team Member Name  Role  Phone

 

 EBENEZER ROSAS MD  PCP  [phone]







Insurance Providers







 Payer Name  Policy Number  Subscriber Name  Relationship

 

 Self Pay     Judie Conklin  18 Self / Same As Patient







Advance Directives







 Directive  Response  Recorded Date/Time

 

 Advance Directives  No  14 12:18pm

 

 Health Care Power of   No  14 12:18pm

 

 Organ Donor  No  14 12:18pm

 

 Resuscitation Status  Full Code  14 12:18pm







Problems

Medical Problems





 Problem  Onset Date  Status

 

 Lumbar radiculopathy  Unknown  Active







Medications







 Medication  Dose  Route  Sig  Days/Qty  Instructions  Order Date  Discontinued 
Date  Status

 

 Oxycodone HCl (Oxycodone IR)                 09  Discontinued

 

 Carisoprodol  350 Mg  PO  EVERY 4HRS PRN        09  
Discontinued

 

 Tolterodine Tartrate                 09  Discontinued

 

 Levothyroxine Sodium                 09  Discontinued

 

 Escitalopram Oxalate                 09  Discontinued

 

 Citalopram Hydrobromide  40 Mg  PO  DAILY        12  
Discontinued

 

 Solifenacin  10 Mg  PO  DAILY        12  Discontinued

 

 Hydrocodone Bit/Acetaminophen  7.5 - 500 Mg  PO  EVERY 4HRS PRN        13  Discontinued

 

 Hydrochlorothiazide  25 Mg  PO  DAILY        12     Active

 

 Meloxicam (Mobic)  15 Mg  PO  DAILY        12     Active

 

 Methylprednisolone  0  PO  AS DIRECTED  1 Qty     12  
Discontinued

 

 Tramadol HCl  50 Mg  PO  Q4-6HOURS PRN  20 Qty  FOR PAIN  12  
Discontinued

 

 Aclidinium Bromide  400 Mcg  IH  AS NEEDED        13  
Discontinued

 

 Gabapentin  300 Mg  PO  FOUR TIMES DAILY        13     Active

 

 Esomeprazole Magnesium  40 Mg  PO  DAILY        13     Active

 

 Black Cohosh Root Extract  80 Mg  PO  DAILY        13     Active

 

 Docusate Sodium  200 Mg  PO  DAILY        13     Active

 

 Cetirizine HCl  10 Mg  PO  DAILY        13     Active

 

 Escitalopram Oxalate  20 Mg  PO  DAILY        13     Active

 

 Bupropion HCl  150 Mg  PO  DAILY        13     Active

 

 Furosemide (Lasix)  40 Mg  PO  DAILY        13     Active

 

 Potassium Chloride  10 Meq  PO  DAILY WITH FOOD        13     Active

 

 [lipozene]  1,500 Mg  PO  DAILY        13  Discontinued

 

 Hydrocodone Bit/Acetaminophen  1 Each  PO  EVERY 4HRS PRN        13  Discontinued

 

 Dicyclomine HCl  1 Each  PO  BEFORE MEALS AND AT BEDTIME  20 Qty  FOR STOMACH 
DISCOMFORT  13  Discontinued

 

 Hyoscyamine Sulfate (Levsin)  1 - 2 Each  PO  Q4HR PRN  30 Qty     13  Discontinued

 

 Ondansetron HCl  4 Mg  PO  EVERY 4HRS  10 Qty  FOR NAUSEA AND VOMITING  13  Discontinued

 

 Solifenacin  10 Mg  PO  DAILY        13     Active

 

 Acetaminophen/Hydrocodone Bitart (Lortab)  7.5-500 Mg  PO  EVERY 4-6 HOURS PRN
        13     Active

 

 Aclidinium Bromide  400 Mcg  IH  DAILY PRN     AS NEEDED FOR SHORTNESS OF 
BREATH  13     Active

 

 Carisoprodol  350 Mg  PO  FOUR TIMES DAILY        13     Active

 

 Cefuroxime Axetil (Ceftin)  1 Each  PO  TWICE A DAY      TWICE DAILY  13  Discontinued

 

 Cyclobenzaprine Hcl  10 Mg  PO  EVERY 8HRS PRN SPASMS  10 Qty     14     
Active







Social History







 Social History Problem  Response  Recorded Date/Time

 

 Alcohol Use  Denies Use  2014 12:18pm

 

 Recreational Drug Use  No  2014 12:18pm

 

 Recent Foreign Travel  No  2014 12:18pm

 

 Recent Infectious Disease Exposure  No  2014 12:18pm

 

 Hospitalization with Isolation  Denies  2014 12:18pm

 

 Smoking Status  Current Everyday Smoker  2014 12:18pm









 Query  Response  Start Date  Stop Date

 

 Smoking Status  Current Everyday Smoker      







Hospital Discharge Instructions

No hospital discharge instructions.



Plan of Care

No plan of care.



Functional Status

No functional status results.



Allergies, Adverse Reactions, Alerts







 Allergen  Type  Severity  Reaction  Status  Last Updated

 

 tramadol HCl  Allergy  Unknown  RASH  Active  13

 

 diazepam ([id number])  Allergy  Unknown     Active  08

 

 morphine  Allergy  Intermediate  NAUSEA/VOMITTING/SYNCOPE  Active  12

 

 propoxyphene (F177366587)  Allergy  Unknown     Active  08

 

 Pentazocine  Allergy  Mild     Active  09

 

 Aspirin  Allergy  Mild     Active  09

 

 methimazole ([id number])  Allergy  Unknown     Active  08

 

 prednisone ([id number])  Adverse Reaction        Active  13

 

 ketorolac ([id number])  Allergy  Mild     Active  09







Immunizations







 Name  Given  Type

 

 Hepatitis B  Yes  Historical

 

 Tetanus Booster (TDap)  Unknown  Historical







Vital Signs

Acute Vital Signs





 Vital  Response  Date/Time

 

 Temperature (Fahrenheit)  97.6 degrees F (97.6 - 99.5)   

 

 Temperature (Calculated Celsius)  36.92269 degrees C (36.4 - 37.5)   

 

 Temperature Source  Temporal     

 

 Pulse Rate (adult)  64 bpm (60 - 90)   

 

 Respiratory Rate  20 bpm (12 - 24)   

 

 O2 Sat by Pulse Oximetry  98 % (88 - 100)   

 

 Blood Pressure  146/102 mm Hg   

 

 Pain      

 

    Pain Intensity  10     

 

 Height (Feet)  5 feet    

 

 Height (Inches)  5 inches    

 

 Height (Calculated Centimeters)  165.046012 cm    

 

 Weight (Pounds)  176 pounds    

 

 Weight (Calculated Kilograms)  79.310868 kilograms    

 

 Calculated BMI  29.28     







Results







 Test  Source  Date  Result  Interp.  Ref. Range  Comments

 

 Alanine Aminotransferase (ALT/SGPT)     2013 5:40am  29 U/L  L  
30-65   

 

 Albumin     2013 5:40am  2.7 G/DL  L  3.4-5.0   

 

 Alkaline Phosphatase     2013 5:40am  108 U/L  N     

 

 Amylase Level     2013 5:40am  26 U/L  N     

 

 Aspartate Amino Transf (AST/SGOT)     2013 5:40am  10 U/L  L  15-
37   

 

 BUN/Creatinine Ratio     2013 5:40am  5     -   

 

 Band Neutrophils     2013 11:21am  0 %     -   

 

 Basophils # (Auto)     2013 5:40am  0.0 10^3/uL  N  0.0-0.1   

 

 Basophils % (Manual)     2013 11:21am  1 %     -   

 

 Basophils (%) (Auto)     2013 5:40am  0 %  N  0-10   

 

 Blood Urea Nitrogen     2013 5:40am  3 MG/DL  L  7-18   

 

 Calcium Level     2013 5:40am  8.7 MG/DL  N  8.5-10.1   

 

 Carbon Dioxide Level     2013 5:40am  25 MMOL/L  N  21-32   

 

 Chloride Level     2013 5:40am  102 MMOL/L  N  101-110   

 

 Creatine Kinase MB     2012 7:50am  0.2 NG/ML  N  0.0-3.6   

 

 Creatinine     2013 5:40am  0.6 MG/DL  N  0.6-1.3   

 

 Eosinophils # (Auto)     2013 5:40am  0.1 10^3/uL  N  0.0-0.3   

 

 Eosinophils % (Manual)     2013 1:01am  2 %     -   

 

 Eosinophils (%) (Auto)     2013 5:40am  1 %  N  0-10   

 

 Glucose Level     2013 5:40am  91 MG/DL  N     

 

 Hematocrit     2013 5:40am  32 %  L  35-52   

 

 Hemoglobin     2013 5:40am  10.7 G/DL  L  11.5-16.0   

 

 Hemoglobin A1c     2012 11:28am  5.4 %  N  4.5-6.2   

 

 Lipase     2013 5:40am  92 U/L  N     

 

 Lymphocytes # (Auto)     2013 5:40am  2.7 X 10^3  N  1.0-4.0   

 

 Lymphocytes % (Manual)     2013 1:01am  22 %     -   

 

 Lymphocytes (%) (Auto)     2013 5:40am  36 %  N  12-44   

 

 Magnesium Level     2008 12:30am  2.1 MG/DL  N  1.8-2.4  Has specimen 
been collected/obtained? YComments to Phlebotomist: Y

 

 Mean Corpuscular Hemoglobin     2013 5:40am  31 PG  N  25-34   

 

 Mean Corpuscular Hemoglobin Concent     2013 5:40am  33 G/DL  N  
32-36   

 

 Mean Corpuscular Volume     2013 5:40am  94 FL  N  80-99   

 

 Mean Platelet Volume     2013 5:40am  9.1 FL  N  7.4-10.4   

 

 Monocytes # (Auto)     2013 5:40am  0.6 X 10^3  N  0.0-1.0   

 

 Monocytes % (Manual)     2013 1:01am  2 %     -   

 

 Monocytes (%) (Auto)     2013 5:40am  8 %  N  0-12   

 

 Myoglobin     2008 12:30am  39 UG/L  N  10-92  Has specimen been 
collected/obtained? YComments to Phlebotomist: Y

 

 Neutrophils # (Auto)     2013 5:40am  4.0 X 10^3  N  1.8-7.8   

 

 Neutrophils % (Manual)     2013 1:01am  74 %     -   

 

 Neutrophils (%) (Auto)     2013 5:40am  54 %  N  42-75   

 

 Platelet Count     2013 5:40am  480 10^3/uL  H  130-400   

 

 Potassium Level     2013 5:40am  3.1 MMOL/L  L  3.6-5.0   

 

 Prothromb Time International Ratio     2012 11:15am  0.8  N  0.8-
1.4                       INTERPRETIVE DATASUGGESTED THERAPEUTIC RANGE FOR INR'S
:

   VENOUS THROMBOSIS, PULMONARY EMBOLISM, OR PREVENTION OF

   SYSTEMIC EMBOLISM (EG. IN ATRIAL FIBRILLATION):

                     2.0  -  3.0

   MECHANICAL PROSTHETIC HEART VALVES:

                     2.5  -  3.5*

*NOTE:  INR'S UP TO 4.5 MAY BE NECESSARY IN SELECTED GROUPS

        OF HIGH RISK PATIENTS.

[employer] CONSENSUS

CONFERENCE ON ANTITHROMBOTIC THERAPY (2000).

 

 Prothrombin Time     2012 11:15am  11.2 SEC  L  12.2-14.7   

 

 Red Blood Count     2013 5:40am  3.44 10^6/uL  L  4.35-5.85   

 

 Red Cell Distribution Width     2013 5:40am  13.1 %  N  10.0-
14.5   

 

 Sodium Level     2013 5:40am  137 MMOL/L  N  135-145   

 

 Thyroid Stimulating Hormone (TSH)     2013 11:21am  4.42 UIU/ML  N  
0.34-5.60   

 

 Total Bilirubin     2013 5:40am  0.2 MG/DL  N  0.0-1.0   

 

 Total Creatine Kinase     2012 7:50am  51 U/L  N  1-159   

 

 Total Protein     2013 5:40am  6.3 G/DL  L  6.4-8.2   

 

 Troponin I     2012 7:50am  < 0.10 NG/ML     0.00-0.10   

 

 Urine Bacteria     2013 1:54am  TRACE /HPF     -  Has specimen 
been collected/obtained? YSpecimen Description CLEAN CATCH

 

 Urine Bilirubin     2013 1:54am  NEGATIVE     -  Has specimen 
been collected/obtained? YSpecimen Description CLEAN CATCH

 

 Urine Casts     2013 1:54am  PRESENT /LPF  H  -  Has specimen 
been collected/obtained? YSpecimen Description CLEAN CATCH

 

 Urine Clarity     2013 1:54am  CLEAR     -  Has specimen been 
collected/obtained? YSpecimen Description CLEAN CATCH

 

 Urine Color     2013 1:54am  YELLOW     -  Has specimen been 
collected/obtained? YSpecimen Description CLEAN CATCH

 

 Urine Crystals     2013 1:54am  NONE /LPF     -  Has specimen 
been collected/obtained? YSpecimen Description CLEAN CATCH

 

 Urine Culture Indicated     2013 1:54am  YES     -  Has specimen 
been collected/obtained? YSpecimen Description CLEAN CATCH

 

 Urine Glucose (UA)     2013 1:54am  NEGATIVE     -  Has specimen 
been collected/obtained? YSpecimen Description CLEAN CATCH

 

 Urine Hyaline Casts     2013 1:54am  2-5 /LPF  H  -  Has 
specimen been collected/obtained? YSpecimen Description CLEAN CATCH

 

 Urine Ketones     2013 1:54am  NEGATIVE     -  Has specimen been 
collected/obtained? YSpecimen Description CLEAN CATCH

 

 Urine Leukocyte Esterase     2013 1:54am  2+  H  -  Has specimen 
been collected/obtained? YSpecimen Description CLEAN CATCH

 

 Urine Mucus     2013 1:54am  NEGATIVE /LPF     -  Has specimen 
been collected/obtained? YSpecimen Description CLEAN CATCH

 

 Urine Nitrite     2013 1:54am  NEGATIVE     -  Has specimen been 
collected/obtained? YSpecimen Description CLEAN CATCH

 

 Urine Protein     2013 1:54am  1+  H  -  Has specimen been 
collected/obtained? YSpecimen Description CLEAN CATCH

 

 Urine RBC     2013 1:54am  NONE /HPF     -  Has specimen been 
collected/obtained? YSpecimen Description CLEAN CATCH

 

 Urine Specific Gravity     2013 1:54am  1.015  L  -  Has 
specimen been collected/obtained? YSpecimen Description CLEAN CATCH

 

 Urine Squamous Epithelial Cells     2013 1:54am  RARE /HPF     -
  Has specimen been collected/obtained? YSpecimen Description CLEAN CATCH

 

 Urine Urobilinogen     2013 1:54am  NORMAL MG/DL     -  Has 
specimen been collected/obtained? YSpecimen Description CLEAN CATCH

 

 Urine WBC     2013 1:54am  10-25 /HPF  H  -  Has specimen been 
collected/obtained? YSpecimen Description CLEAN CATCH

 

 Urine pH     2013 1:54am  5     -  Has specimen been collected/
obtained? YSpecimen Description CLEAN CATCH

 

 White Blood Count     2013 5:40am  7.4 10^3/uL  N  4.3-11.0   

 

 Pathology Consult Specimen     2008 12:30am  See report     -  Has 
specimen been collected/obtained? YComments to Phlebotomist: Y

 

 Lab Scanned Report     2008 10:55am  Referred Lab Report [id number]     
-   

 

 Estimat Glomerular Filtration Rate     2013 1:01am  > 60     -  
                  GFR INTERPRETIVE DATA

         UNITS FOR ESTIMATED GFR (eGFR): mL/min/1.73 M2

         REFERENCE RANGE FOR ESTIMATED GFR (eGFR)

                                          eGFR

         NORMAL eGFR                       >60

         MODERATELY DECREASED eGFR          30-59

         SEVERLY DECREASED eGFR             15-29

         KIDNEY FAILURE                    <15 (OR DIALYSIS)

 

 Blood Morphology Comment     2013 1:01am  NORMAL     -   

 

 Creatine Kinase     2008 12:55pm  48 mg/dl  N    Has specimen 
been collected/obtained? Y

 

 Urine RBC (Auto)     2013 1:54am  NEGATIVE     -  Has specimen 
been collected/obtained? YSpecimen Description CLEAN CATCH

 

 Urine Culture  Urine-Clean Catch  2013 1:54am  Group B 
Streptococci         







Procedures

No known history of procedures.



Encounters







 Encounter  Location  Date/Time

 

 Registered Emergency Room  [facility]  14 12:08pm











 Recent Diagnosis

## 2017-05-09 ENCOUNTER — HOSPITAL ENCOUNTER (OUTPATIENT)
Dept: HOSPITAL 75 - PREOP | Age: 62
Discharge: HOME | End: 2017-05-09
Attending: SURGERY
Payer: COMMERCIAL

## 2017-05-09 VITALS — BODY MASS INDEX: 26.35 KG/M2 | WEIGHT: 158.13 LBS | HEIGHT: 65 IN

## 2017-05-09 VITALS — SYSTOLIC BLOOD PRESSURE: 115 MMHG | DIASTOLIC BLOOD PRESSURE: 72 MMHG

## 2017-05-09 DIAGNOSIS — Z11.2: ICD-10-CM

## 2017-05-09 DIAGNOSIS — K43.9: ICD-10-CM

## 2017-05-09 DIAGNOSIS — Z01.812: Primary | ICD-10-CM

## 2017-05-09 LAB
BASOPHILS # BLD AUTO: 0.2 10^3/UL (ref 0–0.1)
BASOPHILS NFR BLD AUTO: 2 % (ref 0–10)
EOSINOPHIL # BLD AUTO: 0.4 10^3/UL (ref 0–0.3)
EOSINOPHIL NFR BLD AUTO: 5 % (ref 0–10)
ERYTHROCYTE [DISTWIDTH] IN BLOOD BY AUTOMATED COUNT: 15.3 % (ref 10–14.5)
LYMPHOCYTES # BLD AUTO: 1.9 X 10^3 (ref 1–4)
LYMPHOCYTES NFR BLD AUTO: 23 % (ref 12–44)
MCH RBC QN AUTO: 28 PG (ref 25–34)
MCHC RBC AUTO-ENTMCNC: 33 G/DL (ref 32–36)
MCV RBC AUTO: 86 FL (ref 80–99)
MONOCYTES # BLD AUTO: 0.6 X 10^3 (ref 0–1)
MONOCYTES NFR BLD AUTO: 7 % (ref 0–12)
NEUTROPHILS # BLD AUTO: 5.2 X 10^3 (ref 1.8–7.8)
NEUTROPHILS NFR BLD AUTO: 63 % (ref 42–75)
PLATELET # BLD: 423 10^3/UL (ref 130–400)
PMV BLD AUTO: 9.3 FL (ref 7.4–10.4)
RBC # BLD AUTO: 4.35 10^6/UL (ref 4.35–5.85)
WBC # BLD AUTO: 8.2 10^3/UL (ref 4.3–11)

## 2017-05-09 PROCEDURE — 87081 CULTURE SCREEN ONLY: CPT

## 2017-05-09 PROCEDURE — 36415 COLL VENOUS BLD VENIPUNCTURE: CPT

## 2017-05-09 PROCEDURE — 85025 COMPLETE CBC W/AUTO DIFF WBC: CPT

## 2017-05-11 ENCOUNTER — HOSPITAL ENCOUNTER (OUTPATIENT)
Dept: HOSPITAL 75 - SDC | Age: 62
LOS: 1 days | Discharge: HOME | End: 2017-05-12
Attending: SURGERY
Payer: COMMERCIAL

## 2017-05-11 VITALS — BODY MASS INDEX: 26.35 KG/M2 | HEIGHT: 65 IN | WEIGHT: 158.13 LBS

## 2017-05-11 VITALS — SYSTOLIC BLOOD PRESSURE: 117 MMHG | DIASTOLIC BLOOD PRESSURE: 59 MMHG

## 2017-05-11 VITALS — DIASTOLIC BLOOD PRESSURE: 57 MMHG | SYSTOLIC BLOOD PRESSURE: 114 MMHG

## 2017-05-11 VITALS — SYSTOLIC BLOOD PRESSURE: 125 MMHG | DIASTOLIC BLOOD PRESSURE: 71 MMHG

## 2017-05-11 VITALS — SYSTOLIC BLOOD PRESSURE: 103 MMHG | DIASTOLIC BLOOD PRESSURE: 60 MMHG

## 2017-05-11 VITALS — DIASTOLIC BLOOD PRESSURE: 67 MMHG | SYSTOLIC BLOOD PRESSURE: 121 MMHG

## 2017-05-11 VITALS — SYSTOLIC BLOOD PRESSURE: 107 MMHG | DIASTOLIC BLOOD PRESSURE: 75 MMHG

## 2017-05-11 DIAGNOSIS — K43.2: Primary | ICD-10-CM

## 2017-05-11 DIAGNOSIS — K21.9: ICD-10-CM

## 2017-05-11 DIAGNOSIS — F32.9: ICD-10-CM

## 2017-05-11 DIAGNOSIS — F41.9: ICD-10-CM

## 2017-05-11 DIAGNOSIS — E03.9: ICD-10-CM

## 2017-05-11 DIAGNOSIS — F17.210: ICD-10-CM

## 2017-05-11 DIAGNOSIS — I10: ICD-10-CM

## 2017-05-11 DIAGNOSIS — Z79.899: ICD-10-CM

## 2017-05-11 PROCEDURE — 94664 DEMO&/EVAL PT USE INHALER: CPT

## 2017-05-11 RX ADMIN — HYDROMORPHONE HYDROCHLORIDE PRN MG: 2 INJECTION, SOLUTION INTRAMUSCULAR; INTRAVENOUS; SUBCUTANEOUS at 13:18

## 2017-05-11 RX ADMIN — SODIUM CHLORIDE, SODIUM LACTATE, POTASSIUM CHLORIDE, AND CALCIUM CHLORIDE PRN MLS/HR: 600; 310; 30; 20 INJECTION, SOLUTION INTRAVENOUS at 11:55

## 2017-05-11 RX ADMIN — HYDROMORPHONE HYDROCHLORIDE PRN MG: 2 INJECTION, SOLUTION INTRAMUSCULAR; INTRAVENOUS; SUBCUTANEOUS at 13:30

## 2017-05-11 RX ADMIN — SODIUM CHLORIDE, SODIUM LACTATE, POTASSIUM CHLORIDE, AND CALCIUM CHLORIDE PRN MLS/HR: 600; 310; 30; 20 INJECTION, SOLUTION INTRAVENOUS at 09:37

## 2017-05-11 RX ADMIN — SODIUM CHLORIDE, SODIUM LACTATE, POTASSIUM CHLORIDE, AND CALCIUM CHLORIDE PRN MLS/HR: 600; 310; 30; 20 INJECTION, SOLUTION INTRAVENOUS at 16:00

## 2017-05-11 RX ADMIN — HYDROMORPHONE HYDROCHLORIDE PRN MG: 2 INJECTION, SOLUTION INTRAMUSCULAR; INTRAVENOUS; SUBCUTANEOUS at 13:40

## 2017-05-11 RX ADMIN — HYDROCODONE BITARTRATE AND ACETAMINOPHEN PRN EA: 7.5; 325 TABLET ORAL at 16:37

## 2017-05-11 RX ADMIN — HYDROCODONE BITARTRATE AND ACETAMINOPHEN PRN EA: 7.5; 325 TABLET ORAL at 21:10

## 2017-05-11 NOTE — OPERATIVE REPORT
DATE OF SERVICE:  05/11/2017



ATTENDING PRIMARY CLINICIAN:

RAMÍREZ Eastman



PREOPERATIVE DIAGNOSIS:

Ventral abdominal incisional hernia.



POSTOPERATIVE DIAGNOSIS:

Ventral abdominal incisional hernia with the hernia defect approximately 4 cm in

size.



PROCEDURE:

Open ventral abdominal incisional hernia repair with mesh.



SURGEON:

Dr. Moore.



ANESTHESIA:

General endotracheal.



ESTIMATED BLOOD LOSS:

Minimal.



FINDINGS:

Supraumbilical midline ventral abdominal incisional hernia with omentum and

colon within the hernia sac.  The fascial defect was approximately 4 cm in size.



DISPOSITION:

The patient tolerated the procedure well.



INDICATIONS:

The patient is a 61-year-old female known to us.  She initially presented to [facility] Emergency Department on 08/03/2016 with a 4 day history of crampy

abdominal pain, nausea, vomiting and diarrhea.  A CT scan showed a large gastric

ulcer as well as a contained perforation.  She underwent an EGD which showed a

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

and was widely patent.  The pylorus and duodenum appeared normal.  On

08/08/2016, she underwent an exploratory laparotomy and an en bloc fistulectomy

encompassing a partial sleeve gastric resection and segmental transverse

colectomy and reanastomosis.



Since that time, she has done well from a gastrointestinal standpoint.  She did

develop pain along the midline incision 4 months ago, which had increased in

size and a palpable bulge was also noticed.  She was seen in the office and

found to have a ventral abdominal incisional hernia along the middle portion of

the supraumbilical midline incision.  A reducible hernia was identified;

however, tender to palpation.  She does have risk factors including central

adiposity and obesity as well as smoking.



The patient was brought to the operating room, laid supine on the table.  After

adequate IV pain and sedative medications and general endotracheal intubation,

the abdomen was prepped and draped in standard surgical fashion.



0.5% Marcaine with epinephrine was then used to anesthetize the overlying skin

to the hernia defect.  A midline incision was then made using a 15 blade along

the previous scar.  The subcutaneous tissue was then opened using

electrocautery.  The hernia sac was identified and completely dissected out

using blunt dissection as well as electrocautery.  The hernia sac was then

dissected to the fascial defect, which was approximately 4 cm in size.  An area

along the fascial rim was then cleared off using blunt dissection as well as

electrocautery with visualization of good hemostasis.



The hernia sac was then opened using Metzenbaum scissors.  The vein completely

excised under direct visualization.  Omentum as well as portion of the colon was

identified within the hernia sac; however, this was reducible.  Good hemostasis

was observed and a Bard coated polypropylene mesh 6.5 cm in diameter was placed

into the defect and sutured concentrically transfascially to the mesh using

interrupted 0 Prolene sutures.  Good hemostasis was observed.  The subcutaneous

tissue was then reapproximated in 2 layers using 3-0 Vicryl interrupted sutures.

 Skin was closed using 4-0 Monocryl running subcuticular suture.  The wound was

then cleaned and covered with Dermabond.  This was then covered with tonsil

sponges followed by Op-Site, followed by abdominal binder.



The patient tolerated the procedure well.  We will start IV and oral pain

medication as well as a clear liquid diet.  Once she is tolerating clears and

has good pain control with oral pain medications and ambulating well, we will

discharge her home.  She is instructed to do no heavy lifting or exertion for

the next six weeks as well as proceed with smoking cessation as well as to wear

the abdominal binder at all times.





Job ID: [id number]

DocumentID: [id number]

Dictated Date:  05/11/2017 12:55:00

Transcription Date: 05/11/2017 13:45:51

Dictated By: ABISAI MOORE MD

## 2017-05-11 NOTE — PROGRESS NOTE-POST OPERATIVE
Post-Operative Progess Note


Surgeon (s)/Assistant (s)


Surgeon


ABISAI MOORE MD


Assistant:  none





Pre-Operative Diagnosis


ventral abdominal incisional hernia





Post-Operative Diagnosis





same (4cm).





Procedure & Operative Findings


Date of Procedure


5/11/17


Procedure Preformed/Findings


open ventral abdominal incisional hernia repair with mesh.


Anesthesia Type


GET





Estimated Blood Loss


Estimated blood loss (mL):  minimal





Specimens/Packing


Specimens Removed


hernia sac


Packing:  


none











ABISAI MOORE MD May 11, 2017 12:45 pm

## 2017-05-11 NOTE — DISCHARGE INST-SURGICAL
D/C Lap Instructions-OSCAR


New, Converted, or Re-Newed RX:  RX on Chart


Follow Up Appt in 2 weeks





Activity as tolerated


No driving for 24 hours


No driving while on pain medications





Incentive Spirometry use every 2 hours while awake





Regular Diet





Symptoms to Report: Fever over 101 degree F, Nausea/Vomiting 


Infection Signs and Symptoms to report:  Increased redness, Foul odor of wound, 

Increased drainage





Bathing instructions: May shower


Operative Area Clean/Dry;  Keep incision clean/dry





If any problems/questions: Contact your physician or go to Emergency Room











ABISAI MOORE MD May 11, 2017 12:47 pm

## 2017-05-12 VITALS — DIASTOLIC BLOOD PRESSURE: 55 MMHG | SYSTOLIC BLOOD PRESSURE: 102 MMHG

## 2017-05-12 VITALS — DIASTOLIC BLOOD PRESSURE: 53 MMHG | SYSTOLIC BLOOD PRESSURE: 90 MMHG

## 2017-05-12 VITALS — DIASTOLIC BLOOD PRESSURE: 58 MMHG | SYSTOLIC BLOOD PRESSURE: 101 MMHG

## 2017-05-12 VITALS — SYSTOLIC BLOOD PRESSURE: 102 MMHG | DIASTOLIC BLOOD PRESSURE: 55 MMHG

## 2017-05-12 VITALS — DIASTOLIC BLOOD PRESSURE: 57 MMHG | SYSTOLIC BLOOD PRESSURE: 109 MMHG

## 2017-05-12 RX ADMIN — HYDROCODONE BITARTRATE AND ACETAMINOPHEN PRN EA: 7.5; 325 TABLET ORAL at 09:27

## 2017-05-12 RX ADMIN — HYDROCODONE BITARTRATE AND ACETAMINOPHEN PRN EA: 7.5; 325 TABLET ORAL at 01:20

## 2017-05-12 RX ADMIN — HYDROCODONE BITARTRATE AND ACETAMINOPHEN PRN EA: 7.5; 325 TABLET ORAL at 05:31

## 2018-02-21 ENCOUNTER — HOSPITAL ENCOUNTER (EMERGENCY)
Dept: HOSPITAL 75 - ER | Age: 63
Discharge: HOME | End: 2018-02-21
Payer: COMMERCIAL

## 2018-02-21 VITALS — BODY MASS INDEX: 28.99 KG/M2 | HEIGHT: 65 IN | WEIGHT: 174 LBS

## 2018-02-21 VITALS — SYSTOLIC BLOOD PRESSURE: 124 MMHG | DIASTOLIC BLOOD PRESSURE: 68 MMHG

## 2018-02-21 DIAGNOSIS — G40.909: ICD-10-CM

## 2018-02-21 DIAGNOSIS — Z88.8: ICD-10-CM

## 2018-02-21 DIAGNOSIS — Z79.82: ICD-10-CM

## 2018-02-21 DIAGNOSIS — K43.9: Primary | ICD-10-CM

## 2018-02-21 DIAGNOSIS — E03.9: ICD-10-CM

## 2018-02-21 DIAGNOSIS — Z87.19: ICD-10-CM

## 2018-02-21 DIAGNOSIS — Z82.49: ICD-10-CM

## 2018-02-21 DIAGNOSIS — F17.210: ICD-10-CM

## 2018-02-21 DIAGNOSIS — Z88.6: ICD-10-CM

## 2018-02-21 DIAGNOSIS — K62.5: ICD-10-CM

## 2018-02-21 DIAGNOSIS — Z90.89: ICD-10-CM

## 2018-02-21 DIAGNOSIS — Z90.49: ICD-10-CM

## 2018-02-21 DIAGNOSIS — Z90.710: ICD-10-CM

## 2018-02-21 DIAGNOSIS — G43.909: ICD-10-CM

## 2018-02-21 DIAGNOSIS — M06.9: ICD-10-CM

## 2018-02-21 DIAGNOSIS — Z88.5: ICD-10-CM

## 2018-02-21 DIAGNOSIS — K21.9: ICD-10-CM

## 2018-02-21 DIAGNOSIS — F32.9: ICD-10-CM

## 2018-02-21 DIAGNOSIS — I10: ICD-10-CM

## 2018-02-21 LAB
ALBUMIN SERPL-MCNC: 4.3 GM/DL (ref 3.2–4.5)
ALP SERPL-CCNC: 79 U/L (ref 40–136)
ALT SERPL-CCNC: 17 U/L (ref 0–55)
APTT PPP: YELLOW S
BACTERIA #/AREA URNS HPF: (no result) /HPF
BASOPHILS # BLD AUTO: 0.1 10^3/UL (ref 0–0.1)
BASOPHILS NFR BLD AUTO: 1 % (ref 0–10)
BILIRUB SERPL-MCNC: 0.4 MG/DL (ref 0.1–1)
BILIRUB UR QL STRIP: NEGATIVE
BUN/CREAT SERPL: 18
CALCIUM SERPL-MCNC: 9.1 MG/DL (ref 8.5–10.1)
CHLORIDE SERPL-SCNC: 97 MMOL/L (ref 98–107)
CO2 SERPL-SCNC: 21 MMOL/L (ref 21–32)
CREAT SERPL-MCNC: 1.43 MG/DL (ref 0.6–1.3)
EOSINOPHIL # BLD AUTO: 1 10^3/UL (ref 0–0.3)
EOSINOPHIL NFR BLD AUTO: 8 % (ref 0–10)
ERYTHROCYTE [DISTWIDTH] IN BLOOD BY AUTOMATED COUNT: 14.5 % (ref 10–14.5)
FIBRINOGEN PPP-MCNC: (no result) MG/DL
GFR SERPLBLD BASED ON 1.73 SQ M-ARVRAT: 37 ML/MIN
GLUCOSE SERPL-MCNC: 93 MG/DL (ref 70–105)
GLUCOSE UR STRIP-MCNC: NEGATIVE MG/DL
HCT VFR BLD CALC: 35 % (ref 35–52)
HGB BLD-MCNC: 12 G/DL (ref 11.5–16)
HYALINE CASTS #/AREA URNS LPF: (no result) /LPF
KETONES UR QL STRIP: NEGATIVE
LEUKOCYTE ESTERASE UR QL STRIP: (no result)
LYMPHOCYTES # BLD AUTO: 3 X 10^3 (ref 1–4)
LYMPHOCYTES NFR BLD AUTO: 24 % (ref 12–44)
MAGNESIUM SERPL-MCNC: 2.5 MG/DL (ref 1.8–2.4)
MANUAL DIFFERENTIAL PERFORMED BLD QL: NO
MCH RBC QN AUTO: 29 PG (ref 25–34)
MCHC RBC AUTO-ENTMCNC: 34 G/DL (ref 32–36)
MCV RBC AUTO: 86 FL (ref 80–99)
MONOCYTES # BLD AUTO: 1.1 X 10^3 (ref 0–1)
MONOCYTES NFR BLD AUTO: 9 % (ref 0–12)
NEUTROPHILS # BLD AUTO: 7.2 X 10^3 (ref 1.8–7.8)
NEUTROPHILS NFR BLD AUTO: 58 % (ref 42–75)
NITRITE UR QL STRIP: NEGATIVE
PH UR STRIP: 6 [PH] (ref 5–9)
PLATELET # BLD: 335 10^3/UL (ref 130–400)
PMV BLD AUTO: 10 FL (ref 7.4–10.4)
POTASSIUM SERPL-SCNC: 4.1 MMOL/L (ref 3.6–5)
PROT SERPL-MCNC: 7.2 GM/DL (ref 6.4–8.2)
PROT UR QL STRIP: NEGATIVE
RBC # BLD AUTO: 4.08 10^6/UL (ref 4.35–5.85)
RBC #/AREA URNS HPF: (no result) /HPF
SODIUM SERPL-SCNC: 131 MMOL/L (ref 135–145)
SP GR UR STRIP: 1.01 (ref 1.02–1.02)
SQUAMOUS #/AREA URNS HPF: (no result) /HPF
UROBILINOGEN UR-MCNC: NORMAL MG/DL
WBC # BLD AUTO: 12.4 10^3/UL (ref 4.3–11)

## 2018-02-21 PROCEDURE — 81000 URINALYSIS NONAUTO W/SCOPE: CPT

## 2018-02-21 PROCEDURE — 36415 COLL VENOUS BLD VENIPUNCTURE: CPT

## 2018-02-21 PROCEDURE — 80053 COMPREHEN METABOLIC PANEL: CPT

## 2018-02-21 PROCEDURE — 96361 HYDRATE IV INFUSION ADD-ON: CPT

## 2018-02-21 PROCEDURE — 83605 ASSAY OF LACTIC ACID: CPT

## 2018-02-21 PROCEDURE — 96375 TX/PRO/DX INJ NEW DRUG ADDON: CPT

## 2018-02-21 PROCEDURE — 87088 URINE BACTERIA CULTURE: CPT

## 2018-02-21 PROCEDURE — 85025 COMPLETE CBC W/AUTO DIFF WBC: CPT

## 2018-02-21 PROCEDURE — 96374 THER/PROPH/DIAG INJ IV PUSH: CPT

## 2018-02-21 PROCEDURE — 96376 TX/PRO/DX INJ SAME DRUG ADON: CPT

## 2018-02-21 PROCEDURE — 74176 CT ABD & PELVIS W/O CONTRAST: CPT

## 2018-02-21 PROCEDURE — 83735 ASSAY OF MAGNESIUM: CPT

## 2018-02-21 NOTE — ED ABDOMINAL PAIN
General


Chief Complaint:  Abdominal/GI Problems


Stated Complaint:  RECTAL BLEEDING ,STS HAS HERNIA-PAINFUL


Source of Information:  Patient


Exam Limitations:  No Limitations





History of Present Illness


Date Seen by Provider:  Feb 21, 2018


Time Seen by Provider:  01:58


Initial Comments


Patient presents to the ER by private conveyance with chief complaint that for 

the last 2 days she's had progressively worsening pain near her ventral hernia 

site. She describes the pain as crampy and intermittent. Tonight her pain is 

getting worse and she had a large bowel movement after dinner that she 

described as like tomato soup and after that has been bright red bloody in 

appearance. She's had nausea and vomiting and her last oral intake for dinner 

was about 7:00 last night including some ice chips this morning on her way to 

the ER. She describes pain as 10 out of 10 and has not taken anything for it. 

She had a original surgery to repair a either gastric or duodenal ulcer by Dr. Moore, general surgeon approximately 2 years ago and then another surgery last 

May approximately 8 months ago to repair a ventral hernia in the previous 

incision site. She has also had a hysterectomy, gallbladder removed as well as 

her appendix removed. She is denying any chest pain shortness of breath, fevers

, weakness, dysuria.


Patient has a past medical history of hypothyroidism, hypertension, hypokalemia

, mood disorders. She denies any coronary disease or lung disease. She still 

smokes about a third pack per day.





Allergies and Home Medications


Allergies


Coded Allergies:  


     morphine (Verified  Allergy, Intermediate, NAUSEA/VOMITTING/SYNCOPE, 5/9/17

)


     aspirin (Unverified  Allergy, Mild, 5/9/17)


     ketorolac (Unverified  Allergy, Mild, 5/9/17)


     pentazocine (Unverified  Allergy, Mild, 5/9/17)


     diazepam (Verified  Allergy, Unknown, 5/9/17)


     duloxetine (Unverified  Allergy, Unknown, HIVES AND BEHAVIOR EPISODES, 5/9/ 17)


     methimazole (Verified  Allergy, Unknown, 5/9/17)


     nalbuphine (Unverified  Allergy, Unknown, HIVES, 5/9/17)


     propoxyphene (Verified  Allergy, Unknown, 5/9/17)


     topiramate (Unverified  Allergy, Unknown, MOUTH SWELLING AND SORES, 5/9/17)


     tramadol HCl (Unverified  Allergy, Unknown, RASH, 5/9/17)


     prednisone (Unverified  Adverse Reaction, Unknown, 5/9/17)


 seizure





Home Medications


Aspirin/Acetaminophen/Caffeine 1 Tab Tablet, 1 TAB PO BID PRN for HEADACHE, (

Reported)


Bupropion HCl 150 Mg Tablet.er, 150 MG PO DAILY, (Reported)


Cetirizine Hcl 10 Mg Tablet, 10 MG PO DAILY, (Reported)


Cranberry Conc/Ascorbic Acid 1 Each Capsule, 2 CAP PO DAILY, (Reported)


Escitalopram Oxalate 20 Mg Tablet, 20 MG PO DAILY, (Reported)


Hydrochlorothiazide 25 Mg Tablet, 25 MG PO DAILY, (Reported)


Hydrocodone Bit/Acetaminophen 1 Each Tablet, 1 TAB PO BID PRN for PAIN, (

Reported)


Hydrocodone/Acetaminophen 1 Each Tablet, 1-2 EACH PO Q4H, #35


   Prescribed by: ABISAI MOORE on 5/11/17 1246


Levothyroxine Sodium 25 Mcg Tablet, 25 MCG PO DAILY, (Reported)


Meloxicam 15 Mg Tablet, 15 MG PO DAILY, (Reported)


Metaxalone 800 Mg Tablet, 800 MG PO TID PRN for MUSCLE SPASMS, (Reported)


Pantoprazole Sodium 40 Mg Tablet.dr, 40 MG PO DAILY, #90


   Prescribed by: ABISAI MOORE on 8/16/16 1155


Potassium Chloride 10 Meq Tablet.er, 20 MEQ PO DAILY, (Reported)


   TAKES 2 (10 MEQ) TABLETS 


Tolterodine Tartrate 2 Mg Tablet, 4 MG PO DAILY, (Reported)


   TAKES 2 (2MG) TABLETS 





Review of Systems


Constitutional:  No chills, No fever, No malaise


EENTM:  No Blurred Vision, No Double Vision


Respiratory:  Denies Cough, Denies Shortness of Air


Cardiovascular:  Denies Chest Pain, Denies Edema, Denies Syncope


Gastrointestinal:  Abdominal Pain, Blood Streaked Stools, Denies Constipated, 

Diarrhea, Nausea, Poor Fluid Intake, Vomiting


Genitourinary:  Denies Burning, Denies Discharge


Musculoskeletal:  No back pain, No joint swelling


Skin:  No pruritus, No rash


Psychiatric/Neurological:  Denies Headache, Denies Numbness





Past Medical-Social-Family Hx


Patient Social History


Alcohol Use:  Denies Use


Recreational Drug Use:  No


Smoking Status:  Current Everyday Smoker


Type Used:  Cigarettes (0.33 ppd)


Recent Foreign Travel:  No


Contact w/Someone Who Travel:  No


Recent Hopitalizations:  No


Physical Abuse:  No


Sexual Abuse:  No


Mistreated:  No


Fear:  No





Immunizations Up To Date


Tetanus Booster (TDap):  Unknown


PED Vaccines UTD:  No





Seasonal Allergies


Seasonal Allergies:  Yes





Surgeries


History of Surgeries:  Yes (cyst removed from heart and lung when 13, both 

knees scoped& reconstructed,)


Surgeries:  Abdominal, Appendectomy, Gallbladder, Hysterectomy, Orthopedic, 

Thyroidectomy





Respiratory


History of Respiratory Disorde:  No





Cardiovascular


History of Cardiac Disorders:  Yes (cyst removed from heart )


Cardiac Disorders:  Hypertension





Neurological


History of Neurological Disord:  Yes (SEIZURE AFTER PREDNISONE)


Neurological Disorders:  Headaches /Migraines, Seizure Disorder





Reproductive System


Hx Reproductive Disorders:  No


Sexually Transmitted Disease:  No


HIV/AIDS:  No


GYN History:  Hysterectomy





Gastrointestinal


History of Gastrointestinal Di:  Yes (HX BLEEDING ULCERS, VENTRAL HERNIA REPAIR)


Gastrointestinal Disorders:  Gastroesophageal Reflux, Ulcer





Musculoskeletal


History of Musculoskeletal Dis:  Yes (lower back compression fracture)


Musculoskeletal Disorders:  Degenerate Disk Disease, Arthritis, Rheumatoid 

Arthritis, Chronic Back Pain





Endocrine


History of Endocrine Disorders:  Yes


Endocrine Disorders:  Hypothyroidsim





HEENT


Loss of Vision:  Bilateral


Hearing Impairment:  Denies





Cancer


History of Cancer:  No





Psychosocial


History of Psychiatric Problem:  Yes


Behavioral Health Disorders:  Depression


Suicide Risk Score:  0





Integumentary


History of Skin or Integumenta:  No





Blood Transfusions


History of Blood Disorders:  No


Adverse Reaction to a Blood Tr:  No (N/A)





Family Medical History


Significant Family History:  CAD Over 55 Years Old


Family Medial History:  


Arthritis


Cardiovascular disease


Diabetes mellitus


Hypertension


Respiratory disorder


Thyroid disease





No Family History of:


  AIDS


  Abdominal aortic aneurysm


  [place]'s disease


  Alcoholism


  Alzheimer's disease


  Aphasia


  Asthma


  Cancer of mouth


  Cataracts


  Colon cancer


  Completed stroke


  Congenital disease


  Congenital heart disease


  Coronary thrombosis


  Cystic fibrosis


  Deafness or hearing loss


  Dementia


  Drug abuse


  Dysphasia


  Fibrocystic disease of breast


  Gastroenteritis


  Glaucoma


  Headache disorder


  Hypercholesterolemia


  Infertility


  Kidney disease


  Myocardial infarction


  Neoplasm


  Not obtainable due to adoption


  Osteoporosis


  Parkinson's disease


  Prostate cancer


  Psychosocial problem


  Seizure disorder


  Severe allergy


  Tuberculosis


  Visual disorder





Physical Exam


Vital Signs





 VS - Last 72 Hours, by Label








 2/21/18





 01:49


 


Temp 96.4


 


Pulse 77


 


Resp 20


 


B/P (MAP) 131/73 (92)


 


Pulse Ox 99


 


O2 Delivery Room Air





Capillary Refill :


General Appearance:  WD/WN, mild distress


HEENT:  PERRL/EOMI, pharynx normal


Neck:  non-tender, normal inspection


Respiratory:  chest non-tender, lungs clear, normal breath sounds, no 

respiratory distress, no accessory muscle use


Cardiovascular:  normal peripheral pulses, regular rate, rhythm


Peripheral Pulses:  2+ Radial Pulses (R), 2+ Radial Pulses (L)


Gastrointestinal:  abnormal bowel sounds (hyperactive, high-pitched), guarding, 

No rebound, tenderness (directly over the ventral hernia site which is slightly 

protruding from her abdominal wall. Appears to be a reducible, 5-6 cm wide 

defect.)


Neurologic/Psychiatric:  alert, normal mood/affect, oriented x 3


Skin:  normal color, warm/dry





Focused Exam


Evaluation


Lactate Level


Laboratory Tests


2/21/18 03:09: Lactic Acid Level 1.77





Lactic Acid Level





Laboratory Tests








Test


  2/21/18


03:09


 


Lactic Acid Level


  1.77 MMOL/L


(0.50-2.00)











Progress/Results/Core Measures


Results/Orders


Lab Results





Laboratory Tests








Test


  2/21/18


02:01 2/21/18


02:12 2/21/18


03:09 Range/Units


 


 


Urine Color YELLOW     


 


Urine Clarity


  SLIGHTLY


CLOUDY 


  


   


 


 


Urine pH 6    5-9  


 


Urine Specific Gravity 1.010 L   1.016-1.022  


 


Urine Protein NEGATIVE    NEGATIVE  


 


Urine Glucose (UA) NEGATIVE    NEGATIVE  


 


Urine Ketones NEGATIVE    NEGATIVE  


 


Urine Nitrite NEGATIVE    NEGATIVE  


 


Urine Bilirubin NEGATIVE    NEGATIVE  


 


Urine Urobilinogen NORMAL    NORMAL  MG/DL


 


Urine Leukocyte Esterase 3+ H   NEGATIVE  


 


Urine RBC (Auto) NEGATIVE    NEGATIVE  


 


Urine RBC NONE     /HPF


 


Urine WBC 10-25 H    /HPF


 


Urine Squamous Epithelial


Cells 5-10 


  


  


   /HPF


 


 


Urine Crystals NONE     /LPF


 


Urine Bacteria FEW H    /HPF


 


Urine Casts PRESENT     /LPF


 


Urine Hyaline Casts 0-2 H    /LPF


 


Urine Mucus NEGATIVE     /LPF


 


Urine Culture Indicated YES     


 


White Blood Count


  


  12.4 H


  


  4.3-11.0


10^3/uL


 


Red Blood Count


  


  4.08 L


  


  4.35-5.85


10^6/uL


 


Hemoglobin  12.0   11.5-16.0  G/DL


 


Hematocrit  35   35-52  %


 


Mean Corpuscular Volume  86   80-99  FL


 


Mean Corpuscular Hemoglobin  29   25-34  PG


 


Mean Corpuscular Hemoglobin


Concent 


  34 


  


  32-36  G/DL


 


 


Red Cell Distribution Width  14.5   10.0-14.5  %


 


Platelet Count


  


  335 


  


  130-400


10^3/uL


 


Mean Platelet Volume  10.0   7.4-10.4  FL


 


Neutrophils (%) (Auto)  58   42-75  %


 


Lymphocytes (%) (Auto)  24   12-44  %


 


Monocytes (%) (Auto)  9   0-12  %


 


Eosinophils (%) (Auto)  8   0-10  %


 


Basophils (%) (Auto)  1   0-10  %


 


Neutrophils # (Auto)  7.2   1.8-7.8  X 10^3


 


Lymphocytes # (Auto)  3.0   1.0-4.0  X 10^3


 


Monocytes # (Auto)  1.1 H  0.0-1.0  X 10^3


 


Eosinophils # (Auto)


  


  1.0 H


  


  0.0-0.3


10^3/uL


 


Basophils # (Auto)


  


  0.1 


  


  0.0-0.1


10^3/uL


 


Sodium Level  131 L  135-145  MMOL/L


 


Potassium Level  4.1   3.6-5.0  MMOL/L


 


Chloride Level  97 L    MMOL/L


 


Carbon Dioxide Level  21   21-32  MMOL/L


 


Anion Gap  13   5-14  MMOL/L


 


Blood Urea Nitrogen  26 H  7-18  MG/DL


 


Creatinine


  


  1.43 H


  


  0.60-1.30


MG/DL


 


Estimat Glomerular Filtration


Rate 


  37 


  


   


 


 


BUN/Creatinine Ratio  18    


 


Glucose Level  93     MG/DL


 


Calcium Level  9.1   8.5-10.1  MG/DL


 


Magnesium Level  2.5 H  1.8-2.4  MG/DL


 


Total Bilirubin  0.4   0.1-1.0  MG/DL


 


Aspartate Amino Transf


(AST/SGOT) 


  22 


  


  5-34  U/L


 


 


Alanine Aminotransferase


(ALT/SGPT) 


  17 


  


  0-55  U/L


 


 


Alkaline Phosphatase  79     U/L


 


Total Protein  7.2   6.4-8.2  GM/DL


 


Albumin  4.3   3.2-4.5  GM/DL


 


Lactic Acid Level


  


  


  1.77 


  0.50-2.00


MMOL/L








My Orders





Orders - [facility]


Cbc With Automated Diff (2/21/18 01:58)


Comprehensive Metabolic Panel (2/21/18 01:58)


Magnesium (2/21/18 01:58)


Ua Culture If Indicated (2/21/18 01:58)


Saline Lock/Iv-Start (2/21/18 01:58)


Lactated Ringers (Lr 1000 Ml Iv Solution (2/21/18 01:58)


Diatrizoate Meglum/Sodium 37% (Gastrogra (2/21/18 02:00)


Fentanyl  Injection (Sublimaze Injection (2/21/18 02:00)


Ondansetron Injection (Zofran Injectio (2/21/18 02:00)


Fentanyl  Injection (Sublimaze Injection (2/21/18 02:03)


Urine Culture (2/21/18 02:01)


Ct Abdomen/Pelvis Wo (2/21/18 02:47)


Lactic Acid Analyzer (2/21/18 02:50)


Fentanyl  Injection (Sublimaze Injection (2/21/18 03:15)


Fentanyl  Injection (Sublimaze Injection (2/21/18 03:15)





Medications Given in ED





Current Medications








 Medications  Dose


 Ordered  Sig/Zeyad


 Route  Start Time


 Stop Time Status Last Admin


Dose Admin


 


 Fentanyl Citrate  50 mcg  ONCE  ONCE


 IVP  2/21/18 02:00


 2/21/18 02:04 DC 2/21/18 02:15


50 MCG


 


 Fentanyl Citrate  100 mcg  ONCE  ONCE


 IVP  2/21/18 03:15


 2/21/18 03:17 DC 2/21/18 03:17


100 MCG


 


 Lactated Ringer's  1,000 ml @ 


 0 mls/hr  Q0M ONCE


 IV  2/21/18 01:58


 2/21/18 02:04 DC 2/21/18 02:15


1,000 MLS/HR


 


 Ondansetron HCl  4 mg  ONCE  ONCE


 IVP  2/21/18 02:00


 2/21/18 02:04 DC 2/21/18 02:15


4 MG








Vital Signs/I&O





Vital Sign - Last 12Hours








 2/21/18





 01:49


 


Temp 96.4


 


Pulse 77


 


Resp 20


 


B/P (MAP) 131/73 (92)


 


Pulse Ox 99


 


O2 Delivery Room Air








Progress Note #1:  


   Time:  02:07


Progress Note


With her history of multiple abdominal surgeries as well as a ventral hernia 

repair less than a year ago for concern would be if she had adhesions possibly 

causing incarceration of her bowels and/or strangulation. Going to have her 

drink some Gastrografin and get a CT scan of the abdomen. She has aseptic vital 

signs. We will give her some Zofran because of her nausea but she is not 

vomiting presently so probably hold off on NG tube for now.


Progress Note #2:  


   Time:  02:48


Progress Note


Patient's urinalysis is contaminated. We'll hold off deciding what kind of 

antibiotics to use until we see the CT abdomen and also we need an intra-

abdominal coverage as well. Her GFR is low so were going to hold off the IV 

contrast give her some fluids and just use the oral contrast that was already 

given. She's tolerated the Gastrografin findings so far. Her nausea and pain is 

under better control. While we will be able to adequately visualize the 

vasculature of the mesentery her clinical exam is not very convincing for 

mesenteric ischemia. Although she is having bloody stools is not very tender to 

touch except around the site of the hernia. A lactic acid may help rule out 

ischemic findings.


Progress Note #3:  


   Time:  04:24


Progress Note


Lactate does not reveal evidence of ischemic bowel. Her exam does not seem like 

ischemic. Her hernia site is still easily reducible. She has not had any bloody 

bowel movement since she's been here. It would be reasonable for her to follow 

up with Dr. Moore, general surgery tomorrow or the next day for setting up some 

outpatient workup to probably include a colonoscopy and/or discussing elective 

hernia repair. Patient says she has both nausea medicine as well as hydrocodone 

at home that she uses for pain. I have encouraged her to go easy on the opiates 

and use Tylenol and Motrin as well as heating pads if possible to control her 

symptoms. She has Dr. Moore's clinic number and will plan on calling him later 

this morning.





Diagnostic Imaging





   Diagonstic Imaging:  CT (with IV and oral contrast)


   Plain Films/CT/US/NM/MRI:  abdomen, pelvis


Comments


No acute inflammatory obstructive process within the abdomen or pelvis. 

Postsurgical changes consistent with a partial colonic gastric resection. Focal 

herniation of a short segment of small bowel at the midline incision sites, 

without evidence or obstruction or strangulation.


   Reviewed:  Reviewed by Me





Departure


Impression


Impression:  


 Primary Impression:  


 Abdominal wall pain


 Additional Impressions:  


 Ventral hernia


 Qualified Codes:  K43.9 - Ventral hernia without obstruction or gangrene


 Bright red blood per rectum


Disposition:  01 HOME, SELF-CARE


Condition:  Improved





Departure-Patient Inst.


Decision time for Depature:  04:26


Referrals:  


[facility] (PCP/Family)


Primary Care Physician


Patient Instructions:  Abdominal Hernia (DC)





Add. Discharge Instructions:  


Later this morning please call Dr. Moore's clinic at [phone] to request close 

follow-up either this week or early next week. You can discuss the options for 

repair of your ventral hernia as well as discussed the right red blood per 

rectum and need for likely having a colonoscopy done. Tonight your hemoglobin 

was normal is no evidence that you're having any kind of incarceration, 

obstruction or other dangerous surgical finding. You can use the hydrocodone 

and Zofran you have at home as prescribed for pain and nausea. If you can no 

longer easily reduce your hernia back into your abdomen or you start to have 

pain not controlled with your medicines or fever or other worrisome findings 

you should return to care.





All discharge instructions reviewed with patient and/or family. Voiced 

understanding.


Work/School Note:  Work Release Form   Date Seen in the Emergency Department:  

Feb 21, 2018


   Return to Work:  Feb 23, 2018


   Restrictions:  No Restrictions





Copy


Copies To 1:   KEITH GARZA DO; ABISAI MOORE MD, TITUS J Feb 21, 2018 02:08

## 2018-02-21 NOTE — DIAGNOSTIC IMAGING REPORT
PROCEDURE: CT abdomen and pelvis without contrast.



TECHNIQUE: Multiple contiguous axial images were obtained through

the abdomen and pelvis without the use of intravenous contrast. 



INDICATION: Nausea, vomiting and bloody stools.



COMPARISON: 01/27/2017



FINDINGS: Evaluation of the abdominal viscera is mildly limited

without contrast.



Lower chest: The lung bases are clear. No pericardial or pleural

effusion. 



Peritoneum: No free intraperitoneal air or fluid.  



Liver and biliary system: Unenhanced liver is normal. 

Cholecystectomy. Minimal physiologic dilatation of the common

bile duct post cholecystectomy.



Spleen and Pancreas: Spleen is normal.  Unenhanced pancreas is

grossly normal. 



Adrenals: Normal.



 tract: No renal or ureteral calculi. No obstructive uropathy.

Status post hysterectomy. No adnexal mass.



GI tract: Stomach is partially distended with contrast material.

There are surgical changes along the greater curvature of the

gastric body. No bowel obstruction. There is small ventral

supraumbilical (epigastric) hernia with a wide neck defect, and

the hernia sac contains a few nondilated loops of small bowel.

There is no fluid within the hernia sac. The intra-abdominal

small bowel loops are normal in caliber without obstruction.

Surgical changes within the transverse, also present with a

colocolonic anastomotic suture. No pericolonic inflammatory

changes.



Vasculature and Lymph nodes: Normal caliber aorta with a few

scattered atherosclerotic plaques. No abdominal or pelvic

lymphadenopathy.



Musculoskeletal: No concerning osseous lesion. 



IMPRESSION: 

1. No acute inflammatory or obstructive process in the abdomen or

pelvis.

2. Small focal supraumbilical hernia contains a few small bowel

loops within the hernia sac but no features of strangulation. The

hernia defect has a wide neck.

3. No urinary tract calculi.

4. Findings are in agreement with the preliminary report.



Dictated by: 



  Dictated on workstation # [id number]

## 2018-05-14 ENCOUNTER — HOSPITAL ENCOUNTER (OUTPATIENT)
Dept: HOSPITAL 75 - ENDO | Age: 63
Discharge: HOME | End: 2018-05-14
Attending: SURGERY
Payer: COMMERCIAL

## 2018-05-14 VITALS — SYSTOLIC BLOOD PRESSURE: 100 MMHG | DIASTOLIC BLOOD PRESSURE: 58 MMHG

## 2018-05-14 VITALS — DIASTOLIC BLOOD PRESSURE: 58 MMHG | SYSTOLIC BLOOD PRESSURE: 100 MMHG

## 2018-05-14 VITALS — DIASTOLIC BLOOD PRESSURE: 62 MMHG | SYSTOLIC BLOOD PRESSURE: 115 MMHG

## 2018-05-14 VITALS — HEIGHT: 65 IN | WEIGHT: 174.12 LBS | BODY MASS INDEX: 29.01 KG/M2

## 2018-05-14 VITALS — SYSTOLIC BLOOD PRESSURE: 113 MMHG | DIASTOLIC BLOOD PRESSURE: 61 MMHG

## 2018-05-14 DIAGNOSIS — Z88.8: ICD-10-CM

## 2018-05-14 DIAGNOSIS — F17.210: ICD-10-CM

## 2018-05-14 DIAGNOSIS — Z12.11: Primary | ICD-10-CM

## 2018-05-14 DIAGNOSIS — E03.9: ICD-10-CM

## 2018-05-14 DIAGNOSIS — Z80.0: ICD-10-CM

## 2018-05-14 DIAGNOSIS — G43.909: ICD-10-CM

## 2018-05-14 DIAGNOSIS — Z87.19: ICD-10-CM

## 2018-05-14 DIAGNOSIS — F32.9: ICD-10-CM

## 2018-05-14 DIAGNOSIS — Z88.5: ICD-10-CM

## 2018-05-14 DIAGNOSIS — Z79.899: ICD-10-CM

## 2018-05-14 RX ADMIN — MIDAZOLAM HYDROCHLORIDE PRN MG: 1 INJECTION, SOLUTION INTRAMUSCULAR; INTRAVENOUS at 13:26

## 2018-05-14 RX ADMIN — MIDAZOLAM HYDROCHLORIDE PRN MG: 1 INJECTION, SOLUTION INTRAMUSCULAR; INTRAVENOUS at 13:18

## 2018-05-14 RX ADMIN — FENTANYL CITRATE PRN MCG: 50 INJECTION, SOLUTION INTRAMUSCULAR; INTRAVENOUS at 13:19

## 2018-05-14 RX ADMIN — MIDAZOLAM HYDROCHLORIDE PRN MG: 1 INJECTION, SOLUTION INTRAMUSCULAR; INTRAVENOUS at 13:21

## 2018-05-14 RX ADMIN — FENTANYL CITRATE PRN MCG: 50 INJECTION, SOLUTION INTRAMUSCULAR; INTRAVENOUS at 13:25

## 2018-05-14 RX ADMIN — FENTANYL CITRATE PRN MCG: 50 INJECTION, SOLUTION INTRAMUSCULAR; INTRAVENOUS at 13:16

## 2018-05-14 RX ADMIN — MIDAZOLAM HYDROCHLORIDE PRN MG: 1 INJECTION, SOLUTION INTRAMUSCULAR; INTRAVENOUS at 13:15

## 2018-05-14 RX ADMIN — FENTANYL CITRATE PRN MCG: 50 INJECTION, SOLUTION INTRAMUSCULAR; INTRAVENOUS at 13:28

## 2018-05-14 NOTE — XMS REPORT
[facility]

 Created on: 10/26/2017



Judie Conklin

External Reference #: [id number]

: 1955

Sex: Female



Demographics







 Address  [address] [place]  [address]

 

 Preferred Language  Unknown

 

 Marital Status  Unknown

 

 Baptism Affiliation  Unknown

 

 Race  Unknown

 

 Ethnic Group  Unknown





Author







 Author  BENOIT LINDO

 

 [facility]

 

 Address  [address] [place]  [address]



 

 Phone  [phone]







Care Team Providers







 Care Team Member Name  Role  Phone

 

 BENOIT LINDO  Unavailable  [phone]







PROBLEMS







 Type  Condition  ICD9-CM Code  EMM16-HX Code  Onset Dates  Condition Status  
SNOMED Code

 

 Problem  Low back pain     M54.5     Active  736571847

 

 Problem  Other chronic pain     G89.29     Active  35368252

 

 Problem  Chronic or unspecified gastric ulcer with both hemorrhage and 
perforation     K25.6     Active  55433507

 

 Problem  Elevated platelet count     D47.3     Active  9482719

 

 Problem  Eosinophil count raised     D72.1     Active  219616389

 

 Problem  Elevated cholesterol     E78.00     Active  69136631

 

 Problem  Hypokalemia     E87.6     Active  07992847

 

 Problem  Hx of rheumatoid arthritis     Z87.39     Active  053277539

 

 Problem  Rheumatoid arthritis of multiple sites with negative rheumatoid 
factor     M06.09     Active  839987425

 

 Problem  Acquired hypothyroidism     E03.9     Active  774012140

 

 Problem  High risk medication use     Z79.899     Active  081434932

 

 Problem  Anxiety     F41.9     Active  95820290

 

 Problem  Elevated cholesterol     E78.0     Active  94800150

 

 Problem  Overactive bladder     N32.81     Active  458786927







ALLERGIES

No Information



SOCIAL HISTORY

Never Assessed



PLAN OF CARE





VITAL SIGNS





MEDICATIONS







 Medication  Instructions  Dosage  Frequency  Start Date  End Date  Duration  
Status

 

 Hydrocodone-Acetaminophen 7.5-325 MG  Orally 2 times a day  1 tablet as needed
  12h        30 days  Active







RESULTS

No Results



PROCEDURES

No Known procedures



IMMUNIZATIONS

No Known Immunizations



MEDICAL (GENERAL) HISTORY







 Type  Description  Date

 

 Medical History  rheumatoid arthritis   

 

 Medical History  osteoarthritis   

 

 Medical History  depression   

 

 Medical History  uterine prolapse   

 

 Medical History  Perforated Gastric Ulcer-Dr. Del Valle   

 

 Surgical History  thyroidectomy, complete s/p goiter  

 

 Surgical History  left salivary gland removed due to stones/sludge   

 

 Surgical History  right carpal tunnel release   

 

 Surgical History  right ulnar nerve release with piece of elbow removed   

 

 Surgical History  right lower lobectomy: lung s/p benign cyst removal  

 

 Surgical History  partial hysterectomy s/p endometriosis  

 

 Surgical History  appendectomy  

 

 Surgical History  cholecystectomy  

 

 Surgical History  left knee arthroscopy x 3, reconstructed x 1   

 

 Surgical History  right knee arthroscopy x 3, reconstructed x 1   

 

 Surgical History  lumbar surgery  2014

 

 Surgical History  Perforated Gastric Ulcer  2016

 

 Surgical History  Hernia repair by   18

 

 Hospitalization History  childbirth, surgeries   

 

 Hospitalization History  syncope with collapse, dx with hyponatremia, 
hypopotassemia  2015

 

 Hospitalization History  Perforated Gastric Ulcer-Pt voices in hp 21 days  2016

## 2018-05-14 NOTE — XMS REPORT
[facility]

 Created on: 2017



Judie Conklin

External Reference #: [id number]

: 1955

Sex: Female



Demographics







 Address  [address] [place]  [address]

 

 Preferred Language  Unknown

 

 Marital Status  Unknown

 

 Buddhist Affiliation  Unknown

 

 Race  Unknown

 

 Ethnic Group  Unknown





Author







 Author  BENOIT LINDO

 

 Organization  [facility]

 

 Address  [address] [place]  [address]



 

 Phone  [phone]







Care Team Providers







 Care Team Member Name  Role  Phone

 

 BENOIT LINDO  Unavailable  [phone]







PROBLEMS







 Type  Condition  ICD9-CM Code  MBV76-SC Code  Onset Dates  Condition Status  
SNOMED Code

 

 Problem  Low back pain     M54.5     Active  050488855

 

 Problem  Other chronic pain     G89.29     Active  18464124

 

 Problem  Chronic or unspecified gastric ulcer with both hemorrhage and 
perforation     K25.6     Active  04876111

 

 Problem  Elevated platelet count     D47.3     Active  6130390

 

 Problem  Eosinophil count raised     D72.1     Active  286740962

 

 Problem  Elevated cholesterol     E78.00     Active  73006267

 

 Problem  Hypokalemia     E87.6     Active  87954767

 

 Problem  Hx of rheumatoid arthritis     Z87.39     Active  501432449

 

 Problem  Rheumatoid arthritis of multiple sites with negative rheumatoid 
factor     M06.09     Active  086978845

 

 Problem  Acquired hypothyroidism     E03.9     Active  800381022

 

 Problem  High risk medication use     Z79.899     Active  923755261

 

 Problem  Anxiety     F41.9     Active  81153719

 

 Problem  Elevated cholesterol     E78.0     Active  59861999

 

 Problem  Overactive bladder     N32.81     Active  277690692







ALLERGIES

Unknown Allergies



SOCIAL HISTORY

No smoking Hx information available



PLAN OF CARE





VITAL SIGNS





MEDICATIONS







 Medication  Instructions  Dosage  Frequency  Start Date  End Date  Duration  
Status

 

 Hydrocodone-Acetaminophen 7.5-325 MG  Orally 2 times a day  1 tablet as needed
  12h     7 2017  0 days  Active







RESULTS

No Results



PROCEDURES

No Known procedures



IMMUNIZATIONS

No Known Immunizations

## 2018-05-14 NOTE — XMS REPORT
[facility]

 Created on: 2018



Rubin Judie

External Reference #: [id number]

: 1955

Sex: Female



Demographics







 Address  [address] [place]  [address]

 

 Preferred Language  Unknown

 

 Marital Status  Unknown

 

 Anglican Affiliation  Unknown

 

 Race  Unknown

 

 Ethnic Group  Unknown





Author







 Author  BENOIT LINDO

 

 Organization  [facility]

 

 Address  [address] [place]  [address]



 

 Phone  [phone]







Care Team Providers







 Care Team Member Name  Role  Phone

 

 BENOIT LINDO  Unavailable  [phone]







PROBLEMS







 Type  Condition  ICD9-CM Code  MWG52-VS Code  Onset Dates  Condition Status  
SNOMED Code

 

 Problem  Low back pain     M54.5     Active  518482173

 

 Problem  Other chronic pain     G89.29     Active  18550533

 

 Problem  Chronic or unspecified gastric ulcer with both hemorrhage and 
perforation     K25.6     Active  60918437

 

 Problem  Elevated platelet count     D47.3     Active  9079552

 

 Problem  Eosinophil count raised     D72.1     Active  069148529

 

 Problem  Elevated cholesterol     E78.00     Active  44263208

 

 Problem  Hypokalemia     E87.6     Active  06483748

 

 Problem  Hx of rheumatoid arthritis     Z87.39     Active  333669221

 

 Problem  Rheumatoid arthritis of multiple sites with negative rheumatoid 
factor     M06.09     Active  362155071

 

 Problem  Acquired hypothyroidism     E03.9     Active  383005181

 

 Problem  High risk medication use     Z79.899     Active  870575464

 

 Problem  Anxiety     F41.9     Active  28408273

 

 Problem  Elevated cholesterol     E78.0     Active  28155676

 

 Problem  Overactive bladder     N32.81     Active  525830438







ALLERGIES

No Information



ENCOUNTERS







 Encounter  Location  Date  Diagnosis

 

 [facility]  [id number] [id number]  19 Mar, 
2018  Other chronic pain G89.29

 

 [facility]  [address] [facility] [id number] [id number]  07 Mar, 
2018  Abnormal results of kidney function studies R94.4

 

 [facility]  [id number] [place] [address] [id number]    Abnormal results of kidney function studies R94.4

 

 [facility]  [address] [id number] [id number]    Rheumatoid arthritis of multiple sites with negative rheumatoid factor 
M06.09 ; Other chronic pain G89.29 ; High risk medication use Z79.899 ; 
Acquired hypothyroidism E03.9 ; Chronic or unspecified gastric ulcer with both 
hemorrhage and perforation K25.6 ; Hypokalemia E87.6 ; Overactive bladder 
N32.81 and Ventral hernia without obstruction or gangrene K43.9

 

 [facility]  [id number] [place] [id number]    Acquired hypothyroidism E03.9 and Other chronic pain G89.29

 

 [facility]  [id number] [place] [address] [id number]  19 Dec, 
2017  Other chronic pain G89.29

 

 [facility]  [address] [id number]    Other chronic pain G89.29

 

 [facility]  [address] [id number]     

 

 [facility]  [id number] [place] [address] [id number]  19 Oct, 
2017  Other chronic pain G89.29

 

 [facility]  [id number] [place] [address] [id number]  20 Sep, 
2017  Other chronic pain G89.29

 

 [facility]  [id number] [place] [phone]  23 Aug, 2017  Other chronic pain G89.29

 

 [facility]  [id number] [place] [address] [id number]  14 Aug, 
2017  Rheumatoid arthritis of multiple sites with negative rheumatoid factor 
M06.09 ; Acquired hypothyroidism E03.9 ; Other chronic pain G89.29 ; High risk 
medication use Z79.899 ; Elevated cholesterol E78.00 ; Elevated platelet count 
D47.3 and Eosinophil count raised D72.1

 

 [facility]  [id number] [place] [address] [id number]    Rheumatoid arthritis of multiple sites with negative rheumatoid factor 
M06.09 ; Other chronic pain G89.29 ; Elevated cholesterol E78.0 and Acquired 
hypothyroidism E03.9

 

 [facility]  [id number] [place] [address] [id number]    Other chronic pain G89.29

 

 [facility]  [id number][place] [id number]  31 May, 
2017  Other chronic pain G89.29

 

 [facility] [place] [id number] [place] [address] [id number]  22 May, 
2017  Rheumatoid arthritis of multiple sites with negative rheumatoid factor 
M06.09 ; Other chronic pain G89.29 ; High risk medication use Z79.899 ; 
Acquired hypothyroidism E03.9 ; Elevated cholesterol E78.0 and Hypokalemia E87.6

 

 [facility]  [id number] [place] [address] [id number]  17 May, 
2017   

 

 [facility] [place] [id number] [place] [address] [id number]  03 May, 
2017  Other chronic pain G89.29

 

 [facility]  [id number] [place] [address] [id number]  03 May, 
2017   

 

 [facility] [place] [id number] [place] [address] [id number]    Rheumatoid arthritis of multiple sites with negative rheumatoid factor 
M06.09

 

 [id number] [place] [address] [id number]    Other chronic pain G89.29

 

 [facility] [place] [id number] [place] [address] [id number]  15 Mar, 
2017  Abdominal wall defect, acquired M95.8 ; Rheumatoid arthritis of multiple 
sites with negative rheumatoid factor M06.09 ; Other chronic pain G89.29 and 
High risk medication use Z79.899

 

 [facility] [address] [place] [address] [id number]  07 Mar, 
2017  Other chronic pain G89.29

 

 [facility] [address] [place] [address] [id number]    Other chronic pain G89.29

 

 [facility] [place] [id number] [place] [address] [id number]    Periumbilical abdominal pain R10.33 and Abdominal wall defect, acquired 
M95.8

 

 [facility]  [address] [place] [address] [id number]    Periumbilical abdominal pain R10.33 and Nausea R11.0

 

 [address] [place] [address] [id number]    Other chronic pain G89.29 and Elevated cholesterol E78.0

 

 [id number] [place] [address] [id number]  12 Dec, 
2016  Rheumatoid arthritis of multiple sites with negative rheumatoid factor 
M06.09 ; Elevated cholesterol E78.0 ; Other chronic pain G89.29 ; High risk 
medication use Z79.899 and Hypokalemia E87.6

 

 [id number] [place] [address] [id number]  02 Dec, 
2016   

 

 [address] [id number]  31 Oct, 
2016  Overactive bladder N32.81 ; Rheumatoid arthritis of multiple sites with 
negative rheumatoid factor M06.09 ; High risk medication use Z79.899 ; Chronic 
or unspecified gastric ulcer with both hemorrhage and perforation K25.6 ; 
Acquired hypothyroidism E03.9 ; Other chronic pain G89.29 ; Hx of rheumatoid 
arthritis Z87.39 ; Low back pain M54.5 ; Anxiety F41.9 ; Hypokalemia E87.6 and 
Elevated cholesterol E78.00

 

 [id number] [place] [address] [id number]  25 Oct, 
2016   

 

 [id number] [place] [address] [id number]    Elevated cholesterol E78.0

 

 [address] [place] [address] [id number]     

 

 [id number] [place] [address] [id number]    High risk medication use Z79.899 ; Acquired hypothyroidism E03.9 ; 
Effusion of left knee M25.462 ; Effusion, right knee M25.461 ; Pain in right 
knee M25.561 ; Pain in left knee M25.562 and Other chronic pain G89.29

 

 [address] [place] [address] [id number]     

 

 [id number] [place] [address] [id number]  09 May, 
2016   

 

 [facility]  [address] [id number] [phone]     

 

 [facility]  [id number] [place] [address] [id number]     

 

 [facility]  [id number] [place] [address] [id number]  09 Mar, 
2016   

 

 [facility]  [id number] [place] [address] [id number]  07 Mar, 
2016  Other chronic pain G89.29 ; High risk medication use Z79.899 ; Acquired 
hypothyroidism E03.9 ; Other infective acute otitis externa of left ear H60.392 
and Plantar fasciitis M72.2

 

 [facility]  [address] [id number] [place] [address] [id number]  04 Mar, 
2016   

 

 [facility]  [address] [id number] [place] [address] [id number]     

 

 [facility]  [address] [id number] [place] [address] [id number]     

 

 [facility]  [address] [id number][place] [id number]     

 

 [facility]  [address] [id number][place] [id number]  22 Dec, 
2015   

 

 [facility]  [id number] [place] [address] [id number]  08 Dec, 
2015  Acquired hypothyroidism E03.9

 

 [facility]  [address] [id number], [place] [phone]  07 Dec, 
2015   

 

 [facility]  [address] [id number] [place] [address] [id number]  04 Dec, 
2015   

 

 [facility]  [address] [id number][place] [phone]     

 

 [facility] [address] [id number] [place] [phone]  
   

 

 [facility]  [address] [id number][place] [phone]    Acquired hypothyroidism E03.9 ; Low back pain M54.5 ; Other chronic pain 
G89.29 ; Hx of rheumatoid arthritis Z87.39 ; High risk medication use Z79.899 
and Anxiety F41.9

 

 [facility]  [address] [facility] [id number] [address]  19 Oct, 2009   

 

 [facility]  [address] [facility] [id number] [phone]  19 Oct, 2009   

 

 [facility]  [address] [facility] [id number] [place] [phone]  20 Aug, 2009   

 

 [facility]  [address] [facility] [id number] [place] [address]  14 Aug, 2009   







IMMUNIZATIONS

No Known Immunizations



SOCIAL HISTORY

Never Assessed



REASON FOR VISIT

Refill request



PLAN OF CARE





VITAL SIGNS





MEDICATIONS







 Medication  Instructions  Dosage  Frequency  Start Date  End Date  Duration  
Status

 

 Hydrocodone-Acetaminophen 7.5-325 MG  Orally 2 times a day  1 tablet as needed
  12h        0 days  Active







RESULTS

No Results



PROCEDURES

No Known procedures



INSTRUCTIONS





MEDICATIONS ADMINISTERED

No Known Medications



MEDICAL (GENERAL) HISTORY







 Type  Description  Date

 

 Medical History  rheumatoid arthritis   

 

 Medical History  osteoarthritis   

 

 Medical History  depression   

 

 Medical History  uterine prolapse   

 

 Medical History  Perforated Gastric Ulcer-Dr. Del Valle   

 

 Surgical History  thyroidectomy, complete s/p goiter  

 

 Surgical History  left salivary gland removed due to stones/sludge   

 

 Surgical History  right carpal tunnel release   

 

 Surgical History  right ulnar nerve release with piece of elbow removed   

 

 Surgical History  right lower lobectomy: lung s/p benign cyst removal  

 

 Surgical History  partial hysterectomy s/p endometriosis  

 

 Surgical History  appendectomy  

 

 Surgical History  cholecystectomy  

 

 Surgical History  left knee arthroscopy x 3, reconstructed x 1   

 

 Surgical History  right knee arthroscopy x 3, reconstructed x 1   

 

 Surgical History  lumbar surgery  2014

 

 Surgical History  Perforated Gastric Ulcer  2016

 

 Surgical History  Hernia repair by   17

 

 Hospitalization History  childbirth, surgeries   

 

 Hospitalization History  syncope with collapse, dx with hyponatremia, 
hypopotassemia  2015

 

 Hospitalization History  Perforated Gastric Ulcer-Pt voices in hp 21 days  2016

## 2018-05-14 NOTE — XMS REPORT
[facility]

 Created on: 2017



Rubin Judie

External Reference #: [id number]

: 1955

Sex: Female



Demographics







 Address  [address] [place]  [address]

 

 Preferred Language  Unknown

 

 Marital Status  Unknown

 

 Congregational Affiliation  Unknown

 

 Race  Unknown

 

 Ethnic Group  Unknown





Author







 Author  BENOIT LINDO

 

 Organization  [facility]

 

 Address  [address] [place]  [address]



 

 Phone  [phone]







Care Team Providers







 Care Team Member Name  Role  Phone

 

 BENOIT LINDO  Unavailable  [phone]







PROBLEMS







 Type  Condition  ICD9-CM Code  ZYZ25-ZH Code  Onset Dates  Condition Status  
SNOMED Code

 

 Problem  Low back pain     M54.5     Active  792249891

 

 Problem  Other chronic pain     G89.29     Active  76437868

 

 Problem  Chronic or unspecified gastric ulcer with both hemorrhage and 
perforation     K25.6     Active  93128716

 

 Problem  Elevated platelet count     D47.3     Active  2896752

 

 Problem  Eosinophil count raised     D72.1     Active  911480580

 

 Problem  Elevated cholesterol     E78.00     Active  07834329

 

 Problem  Hypokalemia     E87.6     Active  81222176

 

 Problem  Hx of rheumatoid arthritis     Z87.39     Active  366102262

 

 Problem  Rheumatoid arthritis of multiple sites with negative rheumatoid 
factor     M06.09     Active  710463876

 

 Problem  Acquired hypothyroidism     E03.9     Active  408325801

 

 Problem  High risk medication use     Z79.899     Active  691053562

 

 Problem  Anxiety     F41.9     Active  82212499

 

 Problem  Elevated cholesterol     E78.0     Active  96599451

 

 Problem  Overactive bladder     N32.81     Active  975271332







ALLERGIES







 Substance  Reaction  Event Type  Date  Status

 

 Valium  hives  Drug Allergy  22 May, 2017  Active

 

 Tapazole  local swelling  Drug Allergy  22 May, 2017  Active

 

 PredniSONE  seizure  Drug Allergy  22 May, 2017  Active

 

 Ketorolac Tromethamine  hives  Drug Allergy  22 May, 2017  Active

 

 Darvon  hives  Drug Allergy  22 May, 2017  Active

 

 Baclofen  memory loss  Drug Allergy  22 May, 2017  Active







SOCIAL HISTORY

Never Assessed



PLAN OF CARE







 Activity  Details









  









 Follow Up  3 Months Reason:PÉREZ RA







VITAL SIGNS







 Height  65 in  2017

 

 Weight  158 lbs  2017

 

 Temperature  99.1 degrees Fahrenheit  2017

 

 Heart Rate  80 bpm  2017

 

 Respiratory Rate  16   2017

 

 BMI  26.29 kg/m2  2017

 

 Blood pressure systolic  110 mmHg  2017

 

 Blood pressure diastolic  70 mmHg  2017







MEDICATIONS







 Medication  Instructions  Dosage  Frequency  Start Date  End Date  Duration  
Status

 

 Lexapro 20 mg  Orally Once a day  1 tablet  24h        0  Active

 

 BuPROPion HCl (SR) 150 MG  Orally Once a day  1 tablet  24h        0  Active

 

 Hydrochlorothiazide 25 MG  Orally Once a day  1 tablet  24h        0  Active

 

 Detrol 2 MG  Orally Once a day  2 tablets  24h        0  Active

 

 Zofran 8 MG  Orally every 8 hours, PRN  1 tablet          0 days  
Active

 

 Levothyroxine Sodium 25 MCG  Orally Once a day  1 tablet  24h  08 Dec, 2015   
  0 days  Active

 

 Potassium Chloride ER 10 MEQ  Orally Once a day  2 tablets  24h  30 2015 
    0  Active

 

 Hydrocodone-Acetaminophen 7.5-325 MG  Orally 2 times a day  1 tablet as needed
  12h           Active

 

 Meloxicam 15 MG  Orally Once a day  1 tablet  24h        0  Active

 

 Skelaxin 800 MG  Orally Once a day  1 tablet  24h        0  Active

 

 Pantoprazole Sodium 40 mg  Orally Once a day  1 tablet  24h        0  Active







RESULTS

No Results



PROCEDURES

No Known procedures



IMMUNIZATIONS

No Known Immunizations



MEDICAL (GENERAL) HISTORY







 Type  Description  Date

 

 Medical History  rheumatoid arthritis   

 

 Medical History  osteoarthritis   

 

 Medical History  depression   

 

 Medical History  uterine prolapse   

 

 Medical History  Perforated Gastric Ulcer-Dr. Del Valle   

 

 Surgical History  thyroidectomy, complete s/p goiter  

 

 Surgical History  left salivary gland removed due to stones/sludge   

 

 Surgical History  right carpal tunnel release   

 

 Surgical History  right ulnar nerve release with piece of elbow removed   

 

 Surgical History  right lower lobectomy: lung s/p benign cyst removal  

 

 Surgical History  partial hysterectomy s/p endometriosis  

 

 Surgical History  appendectomy  

 

 Surgical History  cholecystectomy  

 

 Surgical History  left knee arthroscopy x 3, reconstructed x 1   

 

 Surgical History  right knee arthroscopy x 3, reconstructed x 1   

 

 Surgical History  lumbar surgery  2014

 

 Surgical History  Perforated Gastric Ulcer  2016

 

 Surgical History  Hernia repair by   17

 

 Hospitalization History  childbirth, surgeries   

 

 Hospitalization History  syncope with collapse, dx with hyponatremia, 
hypopotassemia  2015

 

 Hospitalization History  Perforated Gastric Ulcer-Pt voices in hp 21 days  2016

## 2018-05-14 NOTE — ENDO PROCEDURE RECORD
Endo Procedure Report


Date of Procedure


Last Colonoscopy:  Yes


May 14, 2018


Surgeon (s)


CONRAD QUACH MD





Post Procedure/Op Diagnosis





EGD: Evidence of previous gastric surgery.  No recurrent ulcers


Colonoscopy: No polyps.  Side-to-side anastomosis at the transverse colon





Procedure Performed





Upper GI endoscopy


Colonoscopy to cecum





Description of Procedure


Anesthesia Type:  Conscious Sedation


Specimen(s) collected/removed


None


Description of the Procedure


Indication for the procedures: This lady is due to undergo robotic assisted 

repair of a recurrent ventral hernia, containing part of the transverse colon.  

In the past, she had undergone sleeve resection of the stomach with limited 

resection of the transverse colon and a posterior gastric ulcer, penetrating 

into the colon, creating a gastrocolic fistula.  In addition, she has a 

positive family history of colon cancer as well.  Therefore, it was felt 

reasonable to perform an upper endoscopy to rule out recurrent ulcers and 

concomitant colonoscopy.





Informed consent was obtained after reviewing the procedures in detail.





Description of procedures:





EGD: She was placed in left lateral decubitus position and her vital signs were 

monitored.  Conscious sedation was achieved using Versed and fentanyl.  The 

flexible gastroscope was introduced down the esophagus, past his stomach, into 

the proximal duodenum.





Findings:





Esophagus: Normal


Stomach: Postoperative changes due to previous sleeve resection.  There was no 

recurrent ulcer





Duodenum: Normal





She tolerated the procedure well and was turned around in preparation for 

colonoscopy.





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  No 

recurrence.





Colonoscopy: Digital rectal examination was unremarkable.  The colonoscope was 

then introduced into the rectum and advanced all the way up to the cecum by 

following the bowel preparation was excellent.





The scope was then withdrawn slowly and the mucosa examined in a systematic 

fashion.





Finding: Postoperative changes with a side-to-side anastomosis of the proximal 

transverse colon.





She tolerated the procedures well and was taken back to the nursing area in a 

stable condition.





Impression: Previous gastrocolic fistula requiring segmental resection of the 

transverse colon.  Positive family history.  No polyps





Recommend screening colonoscopy in 5 years.











CONRAD QUACH MD May 14, 2018 2:04 pm

## 2018-05-14 NOTE — XMS REPORT
[facility]

 Created on: 10/04/2017



Rubin Judie

External Reference #: [id number]

: 1955

Sex: Female



Demographics







 Address  [address] [place]  [address]

 

 Preferred Language  Unknown

 

 Marital Status  Unknown

 

 Denominational Affiliation  Unknown

 

 Race  Unknown

 

 Ethnic Group  Unknown





Author







 Author  BENOIT LINDO

 

 Organization  [facility]

 

 Address  [address] [place]  [address]



 

 Phone  [phone]







Care Team Providers







 Care Team Member Name  Role  Phone

 

 BENOIT LINDO  Unavailable  [phone]







PROBLEMS







 Type  Condition  ICD9-CM Code  GHK17-PD Code  Onset Dates  Condition Status  
SNOMED Code

 

 Problem  Low back pain     M54.5     Active  485716247

 

 Problem  Other chronic pain     G89.29     Active  63463224

 

 Problem  Chronic or unspecified gastric ulcer with both hemorrhage and 
perforation     K25.6     Active  03995635

 

 Problem  Elevated platelet count     D47.3     Active  7170937

 

 Problem  Eosinophil count raised     D72.1     Active  499130018

 

 Problem  Elevated cholesterol     E78.00     Active  39343843

 

 Problem  Hypokalemia     E87.6     Active  72838034

 

 Problem  Hx of rheumatoid arthritis     Z87.39     Active  174525320

 

 Problem  Rheumatoid arthritis of multiple sites with negative rheumatoid 
factor     M06.09     Active  414088578

 

 Problem  Acquired hypothyroidism     E03.9     Active  028583776

 

 Problem  High risk medication use     Z79.899     Active  270544595

 

 Problem  Anxiety     F41.9     Active  35714163

 

 Problem  Elevated cholesterol     E78.0     Active  1955

 

 Problem  Overactive bladder     N32.81     Active  886996599







ALLERGIES







 Substance  Reaction  Event Type  Date  Status

 

 Valium  hives  Drug Allergy    Active

 

 Tapazole  local swelling  Drug Allergy    Active

 

 PredniSONE  seizure  Drug Allergy    Active

 

 Ketorolac Tromethamine  hives  Drug Allergy    Active

 

 Darvon  hives  Drug Allergy    Active

 

 Baclofen  memory loss  Drug Allergy    Active







SOCIAL HISTORY

Never Assessed



PLAN OF CARE







 Activity  Details









  









 Follow Up  2 - 3 Days Reason:if s/s not improving and pending US results.







VITAL SIGNS







 Height  65 in  2017

 

 Weight  155.2 lbs  2017

 

 Temperature  98.1 degrees Fahrenheit  2017

 

 Heart Rate  72 bpm  2017

 

 Respiratory Rate  16   2017

 

 BMI  25.82 kg/m2  2017

 

 Blood pressure systolic  118 mmHg  2017

 

 Blood pressure diastolic  70 mmHg  2017







MEDICATIONS







 Medication  Instructions  Dosage  Frequency  Start Date  End Date  Duration  
Status

 

 Lexapro 20 mg  Orally Once a day  1 tablet  24h           Active

 

 Hydrochlorothiazide 25 MG  Orally Once a day  1 tablet  24h           Active

 

 Skelaxin 800 MG  Orally Once a day  1 tablet  24h  04 Dec, 2015        Active

 

 Simvastatin 20 mg  Orally Once a day  1 tablet in the evening  24h       0 days  Active

 

 Potassium Chloride ER 10 MEQ  Orally Once a day  2 tablets  24h  30 2015 
       Active

 

 Black Cohosh 540 MG                    Active

 

 BuPROPion HCl (SR) 150 MG  Orally Once a day  1 tablet  24h           Active

 

 Pantoprazole Sodium 40 mg  Orally Once a day  1 tablet  24h           Active

 

 Detrol 2 MG  Orally Once a day  2 tablets  24h        30  Active

 

 Levothyroxine Sodium 25 MCG  Orally Once a day. REPEAT LAB DUE 16  1 
tablet     08 Dec, 2015        Active

 

 Zofran 8 MG  Orally every 8 hours, PRN  1 tablet          0 days  
Active







RESULTS







 Name  Result  Date  Reference Range

 

 Ultrasound : Abdomen, LIMITED (specify organ)     2017   







PROCEDURES

No Known procedures



IMMUNIZATIONS

No Known Immunizations



MEDICAL (GENERAL) HISTORY







 Type  Description  Date

 

 Medical History  rheumatoid arthritis   

 

 Medical History  osteoarthritis   

 

 Medical History  depression   

 

 Medical History  uterine prolapse   

 

 Medical History  Perforated Gastric Ulcer-Dr. Del Valle   

 

 Surgical History  thyroidectomy, complete s/p goiter  

 

 Surgical History  left salivary gland removed due to stones/sludge   

 

 Surgical History  right carpal tunnel release   

 

 Surgical History  right ulnar nerve release with piece of elbow removed   

 

 Surgical History  right lower lobectomy: lung s/p benign cyst removal  

 

 Surgical History  partial hysterectomy s/p endometriosis  

 

 Surgical History  appendectomy  

 

 Surgical History  cholecystectomy  

 

 Surgical History  left knee arthroscopy x 3, reconstructed x 1   

 

 Surgical History  right knee arthroscopy x 3, reconstructed x 1   

 

 Surgical History  lumbar surgery  2014

 

 Surgical History  Perforated Gastric Ulcer  2016

 

 Surgical History  Hernia repair by   17

 

 Hospitalization History  childbirth, surgeries   

 

 Hospitalization History  syncope with collapse, dx with hyponatremia, 
hypopotassemia  2015

 

 Hospitalization History  Perforated Gastric Ulcer-Pt voices in hp 21 days  2016

## 2018-05-14 NOTE — XMS REPORT
[facility]

 Created on: 2017



Rubin Judie

External Reference #: [id number]

: 1955

Sex: Female



Demographics







 Address  [address] [place]  [address]

 

 Preferred Language  Unknown

 

 Marital Status  Unknown

 

 Advent Affiliation  Unknown

 

 Race  Unknown

 

 Ethnic Group  Unknown





Author







 Author  BENOIT LINDO

 

 Organization  [facility]

 

 Address  [address] [place]  [address]



 

 Phone  [phone]







Care Team Providers







 Care Team Member Name  Role  Phone

 

 BENOIT LINDO  Unavailable  [phone]







PROBLEMS







 Type  Condition  ICD9-CM Code  QES49-UB Code  Onset Dates  Condition Status  
SNOMED Code

 

 Problem  Low back pain     M54.5     Active  944093260

 

 Problem  Other chronic pain     G89.29     Active  75897389

 

 Problem  Chronic or unspecified gastric ulcer with both hemorrhage and 
perforation     K25.6     Active  24581131

 

 Problem  Elevated platelet count     D47.3     Active  5600163

 

 Problem  Eosinophil count raised     D72.1     Active  324031837

 

 Problem  Elevated cholesterol     E78.00     Active  39535339

 

 Problem  Hypokalemia     E87.6     Active  95701118

 

 Problem  Hx of rheumatoid arthritis     Z87.39     Active  500795257

 

 Problem  Rheumatoid arthritis of multiple sites with negative rheumatoid 
factor     M06.09     Active  884617972

 

 Problem  Acquired hypothyroidism     E03.9     Active  032372348

 

 Problem  High risk medication use     Z79.899     Active  877824722

 

 Problem  Anxiety     F41.9     Active  25154043

 

 Problem  Elevated cholesterol     E78.0     Active  38006958

 

 Problem  Overactive bladder     N32.81     Active  763289790







ALLERGIES







 Substance  Reaction  Event Type  Date  Status

 

 Valium  hives  Drug Allergy  12 Dec, 2016  Active

 

 Tapazole  local swelling  Drug Allergy  12 Dec, 2016  Active

 

 PredniSONE  seizure  Drug Allergy  12 Dec, 2016  Active

 

 Ketorolac Tromethamine  hives  Drug Allergy  12 Dec, 2016  Active

 

 Darvon  hives  Drug Allergy  12 Dec, 2016  Active

 

 Baclofen  memory loss  Drug Allergy  12 Dec, 2016  Active







SOCIAL HISTORY

No smoking Hx information available



PLAN OF CARE







 Activity  Details









  









 Follow Up  6 Weeks Reason:CHM pain and hypothroid last week of January







VITAL SIGNS







 Height  65 in  2016

 

 Weight  161.0 lbs  2016

 

 Heart Rate  84 bpm  2016

 

 Respiratory Rate  16   2016

 

 BMI  26.79 kg/m2  2016

 

 Blood pressure systolic  122 mmHg  2016

 

 Blood pressure diastolic  68 mmHg  2016







MEDICATIONS







 Medication  Instructions  Dosage  Frequency  Start Date  End Date  Duration  
Status

 

 Skelaxin 800 MG  Orally Once a day  1 tablet  24h  04 Dec, 2015        Active

 

 Detrol 2 MG  Orally Once a day  2 tablets  24h        30  Active

 

 Potassium Chloride ER 10 MEQ  Orally Once a day  2 tablets  24h  30 2015 
       Active

 

 BuPROPion HCl (SR) 150 MG  Orally Once a day  1 tablet  24h           Active

 

 Black Cohosh 540 MG                    Active

 

 Pantoprazole Sodium 40 mg  Orally Once a day  1 tablet  24h           Active

 

 Levothyroxine Sodium 25 MCG  Orally Once a day. REPEAT LAB DUE 16  1 
tablet     08 Dec, 2015        Active

 

 Hydrocodone-Acetaminophen 7.5-325 MG  Orally 2 times a day  1 tablet as needed
  12h           Active

 

 Lexapro 20 mg  Orally Once a day  1 tablet  24h           Active

 

 Hydrochlorothiazide 25 MG  Orally Once a day  1 tablet  24h           Active







RESULTS







 Name  Result  Date  Reference Range

 

 URINE DRUG SCREEN (IN HOUSE)     2016   

 

 Lot #        

 

 Exp date  2017      

 

 Control  +      

 

 COCAINE  neg      

 

 AMPH  neg      

 

 MTD  neg      

 

 THC  neg      

 

 OPIATE  +      

 

 BENZO  neg      

 

 PCP  neg      

 

 BAR  neg      

 

 OXY  neg      

 

 MAMP  neg      

 

 TCA  neg      

 

 BUP  neg      

 

 MDMA  neg      

 

 LIPID PANEL     2016   

 

 Cholesterol, Total  278     100-199

 

 Triglycerides  155     0-149

 

 HDL Cholesterol  49     >39

 

 VLDL Cholesterol Chance  31     5-40

 

 LDL Cholesterol Calc  198     0-99

 

 Comment:         

 

 CMP     2016   

 

 Glucose, Serum  89     65-99

 

 BUN  10     8-27

 

 Creatinine, Serum  0.71     0.57-1.00

 

 eGFR If NonAfricn Am  93         >59

 

 eGFR If Africn Am  107         >59

 

 BUN/Creatinine Ratio  14     11-26

 

 Sodium, Serum  139     134-144

 

 Potassium, Serum  4.3     3.5-5.2

 

 Chloride, Serum  98     

 

 Carbon Dioxide, Total  22     18-29

 

 Calcium, Serum  9.5     8.7-10.3

 

 Protein, Total, Serum  6.9     6.0-8.5

 

 Albumin, Serum  4.7     3.6-4.8

 

 Globulin, Total  2.2     1.5-4.5

 

 A/G Ratio  2.1     1.1-2.5

 

 Bilirubin, Total  <0.2     0.0-1.2

 

 Alkaline Phosphatase, S  96     

 

 AST (SGOT)  10     0-40

 

 ALT (SGPT)  5     0-32







PROCEDURES







 Procedure  Date Ordered  Related Diagnosis  Body Site

 

 LIPID PANEL  Dec 12, 2016      

 

 COMPREHEN METABOLIC PANEL  Dec 12, 2016      

 

 VENIPUNCT, ROUTINE*  Dec 12, 2016      

 

 DRUG SCREEN NON TLC DEVICES  Dec 12, 2016      

 

 Office Visit, Est Pt., Level 3  Dec 12, 2016      







IMMUNIZATIONS

No Known Immunizations

## 2018-05-14 NOTE — XMS REPORT
[facility]

 Created on: 2017



Rubin Judie

External Reference #: [id number]

: 1955

Sex: Female



Demographics







 Address  [address] [place]  [address]

 

 Preferred Language  Unknown

 

 Marital Status  Unknown

 

 Caodaism Affiliation  Unknown

 

 Race  Unknown

 

 Ethnic Group  Unknown





Author







 Author  BENOIT LINDO

 

 Organization  [facility]

 

 Address  [address] [place]  [address]



 

 Phone  [phone]







Care Team Providers







 Care Team Member Name  Role  Phone

 

 BENOIT LINDO  Unavailable  [phone]







PROBLEMS







 Type  Condition  ICD9-CM Code  VVH95-UK Code  Onset Dates  Condition Status  
SNOMED Code

 

 Problem  Low back pain     M54.5     Active  260688872

 

 Problem  Other chronic pain     G89.29     Active  27547637

 

 Problem  Chronic or unspecified gastric ulcer with both hemorrhage and 
perforation     K25.6     Active  66577383

 

 Problem  Elevated platelet count     D47.3     Active  7539440

 

 Problem  Eosinophil count raised     D72.1     Active  009129223

 

 Problem  Elevated cholesterol     E78.00     Active  07426388

 

 Problem  Hypokalemia     E87.6     Active  12404926

 

 Problem  Hx of rheumatoid arthritis     Z87.39     Active  597772082

 

 Problem  Rheumatoid arthritis of multiple sites with negative rheumatoid 
factor     M06.09     Active  334451359

 

 Problem  Acquired hypothyroidism     E03.9     Active  113667702

 

 Problem  High risk medication use     Z79.899     Active  240485077

 

 Problem  Anxiety     F41.9     Active  47973108

 

 Problem  Elevated cholesterol     E78.0     Active  72635081

 

 Problem  Overactive bladder     N32.81     Active  947822760







ALLERGIES

No Information



SOCIAL HISTORY

Never Assessed



PLAN OF CARE





VITAL SIGNS





MEDICATIONS







 Medication  Instructions  Dosage  Frequency  Start Date  End Date  Duration  
Status

 

 Hydrocodone-Acetaminophen 7.5-325 MG  Orally 2 times a day  1 tablet as needed
  12h     2 2017  0 days  Active







RESULTS

No Results



PROCEDURES

No Known procedures



IMMUNIZATIONS

No Known Immunizations



MEDICAL (GENERAL) HISTORY







 Type  Description  Date

 

 Medical History  rheumatoid arthritis   

 

 Medical History  osteoarthritis   

 

 Medical History  depression   

 

 Medical History  uterine prolapse   

 

 Medical History  Perforated Gastric Ulcer-Dr. Del Valle   

 

 Surgical History  thyroidectomy, complete s/p goiter  

 

 Surgical History  left salivary gland removed due to stones/sludge   

 

 Surgical History  right carpal tunnel release   

 

 Surgical History  right ulnar nerve release with piece of elbow removed   

 

 Surgical History  right lower lobectomy: lung s/p benign cyst removal  

 

 Surgical History  partial hysterectomy s/p endometriosis  

 

 Surgical History  appendectomy  

 

 Surgical History  cholecystectomy  

 

 Surgical History  left knee arthroscopy x 3, reconstructed x 1   

 

 Surgical History  right knee arthroscopy x 3, reconstructed x 1   

 

 Surgical History  lumbar surgery  2014

 

 Surgical History  Perforated Gastric Ulcer  2016

 

 Surgical History  Hernia repair by   17

 

 Hospitalization History  childbirth, surgeries   

 

 Hospitalization History  syncope with collapse, dx with hyponatremia, 
hypopotassemia  2015

 

 Hospitalization History  Perforated Gastric Ulcer-Pt voices in hp 21 days  2016

## 2018-05-14 NOTE — XMS REPORT
[facility]

 Created on: 2017



Rubin Judie

External Reference #: [id number]

: 1955

Sex: Female



Demographics







 Address  [address] [place]  [address]

 

 Preferred Language  Unknown

 

 Marital Status  Unknown

 

 Gnosticist Affiliation  Unknown

 

 Race  Unknown

 

 Ethnic Group  Unknown





Author







 Author  BENOIT LINDO

 

 Organization  [facility]

 

 Address  [address] [place]  [address]



 

 Phone  [phone]







Care Team Providers







 Care Team Member Name  Role  Phone

 

 BENOIT LINDO  Unavailable  [phone]







PROBLEMS







 Type  Condition  ICD9-CM Code  REW97-HU Code  Onset Dates  Condition Status  
SNOMED Code

 

 Problem  Low back pain     M54.5     Active  245057940

 

 Problem  Other chronic pain     G89.29     Active  05461348

 

 Problem  Chronic or unspecified gastric ulcer with both hemorrhage and 
perforation     K25.6     Active  32600577

 

 Problem  Elevated platelet count     D47.3     Active  9545495

 

 Problem  Eosinophil count raised     D72.1     Active  779993195

 

 Problem  Elevated cholesterol     E78.00     Active  53250974

 

 Problem  Hypokalemia     E87.6     Active  60048377

 

 Problem  Hx of rheumatoid arthritis     Z87.39     Active  982975070

 

 Problem  Rheumatoid arthritis of multiple sites with negative rheumatoid 
factor     M06.09     Active  524038956

 

 Problem  Acquired hypothyroidism     E03.9     Active  328531555

 

 Problem  High risk medication use     Z79.899     Active  889228063

 

 Problem  Anxiety     F41.9     Active  23960251

 

 Problem  Elevated cholesterol     E78.0     Active  29985392

 

 Problem  Overactive bladder     N32.81     Active  923436986







ALLERGIES

Unknown Allergies



SOCIAL HISTORY

No smoking Hx information available



PLAN OF CARE





VITAL SIGNS





MEDICATIONS







 Medication  Instructions  Dosage  Frequency  Start Date  End Date  Duration  
Status

 

 Hydrocodone-Acetaminophen 7.5-325 MG  Orally 2 times a day  1 tablet as needed
  12h     3 2017  0 days  Active

 

 Fish Oil 1000 MG  Orally twice a day  1 capsule  12h  03 Phillip, 2017  3 Phillip, 
2017  0 days  Active

 

 Simvastatin 20 mg  Orally Once a day  1 tablet in the evening  24h       0 days  Active







RESULTS

No Results



PROCEDURES

No Known procedures



IMMUNIZATIONS

No Known Immunizations

## 2018-05-14 NOTE — HISTORY & PHYSICIAL
History of Present Illness


History of Present Illness


Reason for visit/HPI


To undergo an upper endoscopy regarding a previous history of a gastric ulcer 

and for concomitant screening colonoscopy.  Positive family history of colon 

cancer


Date of Admission


5/14/18


Date Seen by Provider:  May 14, 2018


Time Seen by Provider:  12:15


I consulted on this patient on


5/14/18


 13:13


Attending Physician


Conrad Quach MD


Admitting Physician


[place]


Consult








Allergies and Home Medications


Allergies


Coded Allergies:  


     morphine (Verified  Allergy, Intermediate, NAUSEA/VOMITTING/SYNCOPE, 5/9/17

)


     aspirin (Unverified  Allergy, Mild, 5/9/17)


     ketorolac (Unverified  Allergy, Mild, 5/9/17)


     pentazocine (Unverified  Allergy, Mild, 5/9/17)


     diazepam (Verified  Allergy, Unknown, 5/9/17)


     duloxetine (Unverified  Allergy, Unknown, HIVES AND BEHAVIOR EPISODES, 5/9/ 17)


     methimazole (Verified  Allergy, Unknown, 5/9/17)


     nalbuphine (Unverified  Allergy, Unknown, HIVES, 5/9/17)


     propoxyphene (Verified  Allergy, Unknown, 5/9/17)


     topiramate (Unverified  Allergy, Unknown, MOUTH SWELLING AND SORES, 5/9/17)


     tramadol HCl (Unverified  Allergy, Unknown, RASH, 5/9/17)


     prednisone (Unverified  Adverse Reaction, Unknown, 5/9/17)


 


 seizure





Home Medications


Aspirin/Acetaminophen/Caffeine 1 Tab Tablet, 1 TAB PO BID PRN for HEADACHE, (

Reported)


Bupropion HCl 150 Mg Tablet.er, 150 MG PO DAILY, (Reported)


Cetirizine Hcl 10 Mg Tablet, 10 MG PO DAILY, (Reported)


Cranberry Conc/Ascorbic Acid 1 Each Capsule, 2 CAP PO DAILY, (Reported)


Escitalopram Oxalate 20 Mg Tablet, 20 MG PO DAILY, (Reported)


Hydrochlorothiazide 25 Mg Tablet, 25 MG PO DAILY, (Reported)


Hydrocodone Bit/Acetaminophen 1 Each Tablet, 1 TAB PO BID PRN for PAIN, (

Reported)


Hydrocodone/Acetaminophen 1 Each Tablet, 1-2 EACH PO Q4H


   Prescribed by: ABISAI MOORE on 5/11/17 1246


Levothyroxine Sodium 25 Mcg Tablet, 25 MCG PO DAILY, (Reported)


Meloxicam 15 Mg Tablet, 15 MG PO DAILY, (Reported)


Metaxalone 800 Mg Tablet, 800 MG PO TID PRN for MUSCLE SPASMS, (Reported)


Pantoprazole Sodium 40 Mg Tablet.dr, 40 MG PO DAILY


   Prescribed by: ABISAI MOORE on 8/16/16 1155


Potassium Chloride 10 Meq Tablet.er, 20 MEQ PO DAILY, (Reported)


   TAKES 2 (10 MEQ) TABLETS 


Tolterodine Tartrate 2 Mg Tablet, 4 MG PO DAILY, (Reported)


   TAKES 2 (2MG) TABLETS 





Patient Home Medication List


Home Medication List Reviewed:  Yes





Past Medical-Social-Family Hx


Patient Social History


Marrital Status:  


Employed/Student:  employed


Alcohol Use:  Occasionally Uses


Alcohol Beverage of Choice:  Wine


Recreational Drug Use:  No (SMOKES 1/2 PPD AT MOST)


Smoking Status:  Current Everyday Smoker


Type Used:  Cigarettes


Recent Foreign Travel:  No


Contact w/other who traveled:  No


Recent Hopitalizations:  No


Recent Infectious Disease Expo:  No





Immunizations Up To Date


Tetanus Booster (TDap):  Unknown


Pediatric:  No





Seasonal Allergies


Seasonal Allergies:  Yes





Surgeries


Yes (cyst removed from heart and lung when 13, both knees scoped& reconstructed,

)


Abdominal, Appendectomy, Gallbladder, Hysterectomy, Orthopedic, Thyroidectomy





Respiratory


No





Cardiovascular


Yes (cyst removed from heart )


Hypertension





Neurological


Yes (SEIZURE AFTER PREDNISONE)


Headaches /Migraines, Seizure Disorder





Reproductive System


Hx Reproductive Disorders:  No


Sexually Transmitted Disease:  No


HIV/AIDS:  No


GYN History:  Hysterectomy





Gastrointestinal


Yes (HX BLEEDING ULCERS, VENTRAL HERNIA REPAIR)


Gastroesophageal Reflux, Ulcer





Musculoskeletal


Yes (lower back compression fracture)


Degenerate Disk Disease, Arthritis, Rheumatoid Arthritis, Chronic Back Pain





Endocrine


History of Endocrine Disorders:  Yes


Endocrine Disorders:  Hypothyroidsim





HEENT


Loss of Vision:  Bilateral


Hearing Impairment:  Denies





Cancer


No





Psychosocial


History of Psychiatric Problem:  Yes


Behavioral Health Disorders:  Depression





Integumentary


History of Skin or Integumenta:  No





Blood Transfusions


History of Blood Disorders:  No


Adverse Reaction to a Blood Tr:  No (N/A)





Family Medical History


Significant Family History:  CAD Over 55 Years Old


Family Hx:  


Arthritis


Cardiovascular disease


Diabetes mellitus


Hypertension


Respiratory disorder


Thyroid disease





No Family History of:


  AIDS


  Abdominal aortic aneurysm


  Newfield's disease


  Alcoholism


  Alzheimer's disease


  Aphasia


  Asthma


  Cancer of mouth


  Cataracts


  Colon cancer


  Completed stroke


  Congenital disease


  Congenital heart disease


  Coronary thrombosis


  Cystic fibrosis


  Deafness or hearing loss


  Dementia


  Drug abuse


  Dysphasia


  Fibrocystic disease of breast


  Gastroenteritis


  Glaucoma


  Headache disorder


  Hypercholesterolemia


  Infertility


  Kidney disease


  Myocardial infarction


  Neoplasm


  Not obtainable due to adoption


  Osteoporosis


  Parkinson's disease


  Prostate cancer


  Psychosocial problem


  Seizure disorder


  Severe allergy


  Tuberculosis


  Visual disorder





Constitutional:  no symptoms reported


EENTM:  no symptoms reported


Respiratory:  no symptoms reported


Gastrointestinal:  see HPI


Genitourinary:  no symptoms reported


Musculoskeletal:  no symptoms reported


Skin:  no symptoms reported


Psychiatric/Neurological:  No Symptoms Reported





Physical Exam


Vital Signs


Capillary Refill :


General Appearance:  No Apparent Distress


Neck:  Normal Inspection


Respiratory:  Lungs Clear


Cardiovascular:  Regular Rate, Rhythm


Gastrointestinal:  Non Tender, Soft


Rectal:  Deferred


Back:  Normal Inspection


Extremity:  Normal Inspection


Neurologic/Psychiatric:  Alert, Oriented x3


Skin:  Warm/Dry





Assessment/Plan


Assessment and Plan


Lady with a history of gastric.  Family history of colon cancer.  Therefore 

upper endoscopy with colonoscopy





Admission Diagnosis


Admission Status:  Other (Outpt Proc)











CONRAD QUACH MD May 14, 2018 1:15 pm

## 2018-05-14 NOTE — XMS REPORT
[facility]

 Created on: 10/01/2017



Judie Conklin

External Reference #: [id number]

: 1955

Sex: Female



Demographics







 Address  [address] [place]  [address]

 

 Preferred Language  Unknown

 

 Marital Status  Unknown

 

 Religion Affiliation  Unknown

 

 Race  Unknown

 

 Ethnic Group  Unknown





Author







 Author  BENOIT LINDO

 

 Organization  [facility]

 

 Address  [address] [place]  [address]



 

 Phone  [phone]







Care Team Providers







 Care Team Member Name  Role  Phone

 

 BENOIT LINDO  Unavailable  [phone]







PROBLEMS







 Type  Condition  ICD9-CM Code  ATV94-II Code  Onset Dates  Condition Status  
SNOMED Code

 

 Problem  Low back pain     M54.5     Active  943289406

 

 Problem  Other chronic pain     G89.29     Active  70606909

 

 Problem  Chronic or unspecified gastric ulcer with both hemorrhage and 
perforation     K25.6     Active  69638857

 

 Problem  Elevated platelet count     D47.3     Active  6013998

 

 Problem  Eosinophil count raised     D72.1     Active  634864703

 

 Problem  Elevated cholesterol     E78.00     Active  42053199

 

 Problem  Hypokalemia     E87.6     Active  97527891

 

 Problem  Hx of rheumatoid arthritis     Z87.39     Active  622007456

 

 Problem  Rheumatoid arthritis of multiple sites with negative rheumatoid 
factor     M06.09     Active  050333390

 

 Problem  Acquired hypothyroidism     E03.9     Active  688457720

 

 Problem  High risk medication use     Z79.899     Active  055372533

 

 Problem  Anxiety     F41.9     Active  29088326

 

 Problem  Elevated cholesterol     E78.0     Active  98367965

 

 Problem  Overactive bladder     N32.81     Active  567524514







ALLERGIES

No Information



SOCIAL HISTORY

Never Assessed



PLAN OF CARE





VITAL SIGNS





MEDICATIONS







 Medication  Instructions  Dosage  Frequency  Start Date  End Date  Duration  
Status

 

 Hydrocodone-Acetaminophen 7.5-325 MG  Orally 2 times a day  1 tablet as needed
  12h        0 days  Active







RESULTS

No Results



PROCEDURES

No Known procedures



IMMUNIZATIONS

No Known Immunizations



MEDICAL (GENERAL) HISTORY







 Type  Description  Date

 

 Medical History  rheumatoid arthritis   

 

 Medical History  osteoarthritis   

 

 Medical History  depression   

 

 Medical History  uterine prolapse   

 

 Medical History  Perforated Gastric Ulcer-Dr. Del Valle   

 

 Surgical History  thyroidectomy, complete s/p goiter  

 

 Surgical History  left salivary gland removed due to stones/sludge   

 

 Surgical History  right carpal tunnel release   

 

 Surgical History  right ulnar nerve release with piece of elbow removed   

 

 Surgical History  right lower lobectomy: lung s/p benign cyst removal  

 

 Surgical History  partial hysterectomy s/p endometriosis  

 

 Surgical History  appendectomy  

 

 Surgical History  cholecystectomy  

 

 Surgical History  left knee arthroscopy x 3, reconstructed x 1   

 

 Surgical History  right knee arthroscopy x 3, reconstructed x 1   

 

 Surgical History  lumbar surgery  2014

 

 Surgical History  Perforated Gastric Ulcer  2016

 

 Surgical History  Hernia repair by   17

 

 Hospitalization History  childbirth, surgeries   

 

 Hospitalization History  syncope with collapse, dx with hyponatremia, 
hypopotassemia  2015

 

 Hospitalization History  Perforated Gastric Ulcer-Pt voices in hp 21 days  2016

## 2018-05-14 NOTE — XMS REPORT
[facility]

 Created on: 2018



Rubin Judie

External Reference #: [id number]

: 1955

Sex: Female



Demographics







 Address  [address] [place]  [address]

 

 Preferred Language  Unknown

 

 Marital Status  Unknown

 

 Mandaeism Affiliation  Unknown

 

 Race  Unknown

 

 Ethnic Group  Unknown





Author







 Author  BENOIT LINDO

 

 Organization  [facility]

 

 Address  [address] [place]  [address]



 

 Phone  [phone]







Care Team Providers







 Care Team Member Name  Role  Phone

 

 BENOIT LINDO  Unavailable  [phone]







PROBLEMS







 Type  Condition  ICD9-CM Code  PIS79-CG Code  Onset Dates  Condition Status  
SNOMED Code

 

 Problem  Low back pain     M54.5     Active  579022205

 

 Problem  Other chronic pain     G89.29     Active  68873302

 

 Problem  Chronic or unspecified gastric ulcer with both hemorrhage and 
perforation     K25.6     Active  57177385

 

 Problem  Elevated platelet count     D47.3     Active  2248733

 

 Problem  Eosinophil count raised     D72.1     Active  854083601

 

 Problem  Elevated cholesterol     E78.00     Active  96019015

 

 Problem  Hypokalemia     E87.6     Active  95682657

 

 Problem  Hx of rheumatoid arthritis     Z87.39     Active  596644563

 

 Problem  Rheumatoid arthritis of multiple sites with negative rheumatoid 
factor     M06.09     Active  540638788

 

 Problem  Acquired hypothyroidism     E03.9     Active  135375977

 

 Problem  High risk medication use     Z79.899     Active  098297284

 

 Problem  Anxiety     F41.9     Active  16670583

 

 Problem  Elevated cholesterol     E78.0     Active  76267996

 

 Problem  Overactive bladder     N32.81     Active  373672222







ALLERGIES

No Information



ENCOUNTERS







 Encounter  Location  Date  Diagnosis

 

 [facility]  [id number] [id number]  19 Mar, 
2018  Other chronic pain G89.29

 

 [facility]  [address] [facility] [id number] [id number]  07 Mar, 
2018  Abnormal results of kidney function studies R94.4

 

 [facility]  [id number] [place] [address] [id number]    Abnormal results of kidney function studies R94.4

 

 [facility]  [address] [id number] [id number]    Rheumatoid arthritis of multiple sites with negative rheumatoid factor 
M06.09 ; Other chronic pain G89.29 ; High risk medication use Z79.899 ; 
Acquired hypothyroidism E03.9 ; Chronic or unspecified gastric ulcer with both 
hemorrhage and perforation K25.6 ; Hypokalemia E87.6 ; Overactive bladder 
N32.81 and Ventral hernia without obstruction or gangrene K43.9

 

 [facility]  [id number] [place] [id number]    Acquired hypothyroidism E03.9 and Other chronic pain G89.29

 

 [facility]  [id number] [place] [id number]  19 Dec, 
2017  Other chronic pain G89.29

 

 [facility]  [address] [id number]    Other chronic pain G89.29

 

 [facility]  [address] [id number]     

 

 [facility]  [id number] [place] [address] [id number]  19 Oct, 
2017  Other chronic pain G89.29

 

 [facility]  [id number] [place] [address] [id number]  20 Sep, 
2017  Other chronic pain G89.29

 

 [facility]  [id number] [place] [phone]
[phone]  23 Aug, 2017  Other chronic pain G89.29

 

 [facility]  [id number] [place] [address] [id number]  14 Aug, 
2017  Rheumatoid arthritis of multiple sites with negative rheumatoid factor 
M06.09 ; Acquired hypothyroidism E03.9 ; Other chronic pain G89.29 ; High risk 
medication use Z79.899 ; Elevated cholesterol E78.00 ; Elevated platelet count 
D47.3 and Eosinophil count raised D72.1

 

 [facility]  [id number] [place] [address] [id number]    Rheumatoid arthritis of multiple sites with negative rheumatoid factor 
M06.09 ; Other chronic pain G89.29 ; Elevated cholesterol E78.0 and Acquired 
hypothyroidism E03.9

 

 [facility]  [id number] [place] [address] [id number]    Other chronic pain G89.29

 

 [facility]  [id number] [id number]  31 May, 
2017  Other chronic pain G89.29

 

 [facility] [place] [id number] [place] [id number]  22 May, 
2017  Rheumatoid arthritis of multiple sites with negative rheumatoid factor 
M06.09 ; Other chronic pain G89.29 ; High risk medication use Z79.899 ; 
Acquired hypothyroidism E03.9 ; Elevated cholesterol E78.0 and Hypokalemia E87.6

 

 [facility]  [id number] [place] [address] [id number]  17 May, 
2017   

 

 [facility] [place] [id number] [place] [address] [id number]  03 May, 
2017  Other chronic pain G89.29

 

 [facility]  [id number] [place] [address] [id number]  03 May, 
2017   

 

 [facility] [place] [id number] [place] [address] [id number]    Rheumatoid arthritis of multiple sites with negative rheumatoid factor 
M06.09

 

 [id number] [place] [address] [id number]    Other chronic pain G89.29

 

 [facility] [place] [id number] [place] [address] [id number]  15 Mar, 
2017  Abdominal wall defect, acquired M95.8 ; Rheumatoid arthritis of multiple 
sites with negative rheumatoid factor M06.09 ; Other chronic pain G89.29 and 
High risk medication use Z79.899

 

 [facility] [address] [place] [address] [id number]  07 Mar, 
2017  Other chronic pain G89.29

 

 [facility] [address] [place] [address] [id number]    Other chronic pain G89.29

 

 [facility] [place] [id number] [place] [address] [id number]    Periumbilical abdominal pain R10.33 and Abdominal wall defect, acquired 
M95.8

 

 [facility]  [address] [place] [id number]    Periumbilical abdominal pain R10.33 and Nausea R11.0

 

 [address] [place] [address] [id number]    Other chronic pain G89.29 and Elevated cholesterol E78.0

 

 [id number] [place] [address] [id number]  12 Dec, 
2016  Rheumatoid arthritis of multiple sites with negative rheumatoid factor 
M06.09 ; Elevated cholesterol E78.0 ; Other chronic pain G89.29 ; High risk 
medication use Z79.899 and Hypokalemia E87.6

 

 [id number] [place] [address] [id number]  02 Dec, 
2016   

 

 [address] [id number]  31 Oct, 
2016  Overactive bladder N32.81 ; Rheumatoid arthritis of multiple sites with 
negative rheumatoid factor M06.09 ; High risk medication use Z79.899 ; Chronic 
or unspecified gastric ulcer with both hemorrhage and perforation K25.6 ; 
Acquired hypothyroidism E03.9 ; Other chronic pain G89.29 ; Hx of rheumatoid 
arthritis Z87.39 ; Low back pain M54.5 ; Anxiety F41.9 ; Hypokalemia E87.6 and 
Elevated cholesterol E78.00

 

 [id number] [place] [address] [id number]  25 Oct, 
2016   

 

 [id number] [place] [address] [id number]    Elevated cholesterol E78.0

 

 [address] [place] [address] [id number]     

 

 [id number] [place] [address] [id number]    High risk medication use Z79.899 ; Acquired hypothyroidism E03.9 ; 
Effusion of left knee M25.462 ; Effusion, right knee M25.461 ; Pain in right 
knee M25.561 ; Pain in left knee M25.562 and Other chronic pain G89.29

 

 [address] [place] [address] [id number]     

 

 [id number] [place] [address] [id number]  09 May, 
2016   

 

 [facility]  [address] [id number] [id number]     

 

 [facility]  [address] [id number] [place] [id number]     

 

 [facility]  [address] [id number] [place] [address] [id number]  09 Mar, 
2016   

 

 [facility]  [address] [id number] [place] [address] [id number]  07 Mar, 
2016  Other chronic pain G89.29 ; High risk medication use Z79.899 ; Acquired 
hypothyroidism E03.9 ; Other infective acute otitis externa of left ear H60.392 
and Plantar fasciitis M72.2

 

 [facility]  [address] [id number] [place] [address] [id number]  04 Mar, 
2016   

 

 [facility]  [address] [id number] [place] [address] [id number]     

 

 [facility]  [address] [id number] [place] [address] [id number]     

 

 [facility]  [address] [id number][place] [id number]     

 

 [facility]  [address] [id number][place] [id number]  22 Dec, 
2015   

 

 [facility]  [address] [id number] [place] [address] [id number]  08 Dec, 
2015  Acquired hypothyroidism E03.9

 

 [facility]  [address] [id number] [id number]  07 Dec, 
2015   

 

 [facility]  [address] [id number] [place] [address] [id number]  04 Dec, 
2015   

 

 [facility]  [address] [id number][place] [id number]     

 

 [facility] [place] [id number] [place] [id number]  
   

 

 [facility]  [address] [id number][place] [id number]    Acquired hypothyroidism E03.9 ; Low back pain M54.5 ; Other chronic pain 
G89.29 ; Hx of rheumatoid arthritis Z87.39 ; High risk medication use Z79.899 
and Anxiety F41.9

 

 [facility]  [address] [facility] [id number] [place] [address]  19 Oct, 2009   

 

 [facility]  [address] [facility] [id number][place] [phone]  19 Oct, 2009   

 

 [facility]  [address] [facility] [id number], [place] [address]  20 Aug, 2009   

 

 [place] [address] [facility] [id number][place] [address]  14 Aug, 2009   







IMMUNIZATIONS

No Known Immunizations



SOCIAL HISTORY

Never Assessed



REASON FOR VISIT

Lab draw  Trina MIKE



PLAN OF CARE





VITAL SIGNS





MEDICATIONS







 Medication  Instructions  Dosage  Frequency  Start Date  End Date  Duration  
Status

 

 Hydrocodone-Acetaminophen 7.5-325 MG  Orally 2 times a day  1 tablet as needed
  12h           Active







RESULTS

No Results



PROCEDURES







 Procedure  Date Ordered  Result  Body Site

 

 COMPLETE CBC W/AUTO DIFF WBC  2017      

 

 COMPREHEN METABOLIC PANEL  2017      

 

 ASSAY THYROID STIM HORMONE  2017      

 

 LIPID PANEL  2017      

 

 VENIPUNCT, ROUTINE*  2017      







INSTRUCTIONS





MEDICATIONS ADMINISTERED

No Known Medications



MEDICAL (GENERAL) HISTORY







 Type  Description  Date

 

 Medical History  rheumatoid arthritis   

 

 Medical History  osteoarthritis   

 

 Medical History  depression   

 

 Medical History  uterine prolapse   

 

 Medical History  Perforated Gastric Ulcer-Dr. Del Valle   

 

 Surgical History  thyroidectomy, complete s/p goiter  

 

 Surgical History  left salivary gland removed due to stones/sludge   

 

 Surgical History  right carpal tunnel release   

 

 Surgical History  right ulnar nerve release with piece of elbow removed   

 

 Surgical History  right lower lobectomy: lung s/p benign cyst removal  

 

 Surgical History  partial hysterectomy s/p endometriosis  

 

 Surgical History  appendectomy  

 

 Surgical History  cholecystectomy  

 

 Surgical History  left knee arthroscopy x 3, reconstructed x 1   

 

 Surgical History  right knee arthroscopy x 3, reconstructed x 1   

 

 Surgical History  lumbar surgery  2014

 

 Surgical History  Perforated Gastric Ulcer  2016

 

 Surgical History  Hernia repair by   17

 

 Hospitalization History  childbirth, surgeries   

 

 Hospitalization History  syncope with collapse, dx with hyponatremia, 
hypopotassemia  2015

 

 Hospitalization History  Perforated Gastric Ulcer-Pt voices in hp 21 days  2016

## 2018-05-14 NOTE — XMS REPORT
[facility]

 Created on: 10/26/2017



Judie Conklin

External Reference #: [id number]

: 1955

Sex: Female



Demographics







 Address  [address] [place]  [address]

 

 Preferred Language  Unknown

 

 Marital Status  Unknown

 

 Restorationist Affiliation  Unknown

 

 Race  Unknown

 

 Ethnic Group  Unknown





Author







 Author  BENOIT LINDO

 

 Organization  [facility]

 

 Address  [address] [place]  [address]



 

 Phone  [phone]







Care Team Providers







 Care Team Member Name  Role  Phone

 

 BENOIT LINDO  Unavailable  [phone]







PROBLEMS







 Type  Condition  ICD9-CM Code  IXQ57-WO Code  Onset Dates  Condition Status  
SNOMED Code

 

 Problem  Low back pain     M54.5     Active  059715460

 

 Problem  Other chronic pain     G89.29     Active  26656503

 

 Problem  Chronic or unspecified gastric ulcer with both hemorrhage and 
perforation     K25.6     Active  68265106

 

 Problem  Elevated platelet count     D47.3     Active  7551372

 

 Problem  Eosinophil count raised     D72.1     Active  308747281

 

 Problem  Elevated cholesterol     E78.00     Active  21741769

 

 Problem  Hypokalemia     E87.6     Active  03335781

 

 Problem  Hx of rheumatoid arthritis     Z87.39     Active  516719923

 

 Problem  Rheumatoid arthritis of multiple sites with negative rheumatoid 
factor     M06.09     Active  300163344

 

 Problem  Acquired hypothyroidism     E03.9     Active  428066953

 

 Problem  High risk medication use     Z79.899     Active  514547349

 

 Problem  Anxiety     F41.9     Active  68948392

 

 Problem  Elevated cholesterol     E78.0     Active  93850037

 

 Problem  Overactive bladder     N32.81     Active  995328703







ALLERGIES

No Information



SOCIAL HISTORY

Never Assessed



PLAN OF CARE





VITAL SIGNS





MEDICATIONS

Unknown Medications



RESULTS

No Results



PROCEDURES

No Known procedures



IMMUNIZATIONS

No Known Immunizations



MEDICAL (GENERAL) HISTORY







 Type  Description  Date

 

 Medical History  rheumatoid arthritis   

 

 Medical History  osteoarthritis   

 

 Medical History  depression   

 

 Medical History  uterine prolapse   

 

 Medical History  Perforated Gastric Ulcer-Dr. Del Valle   

 

 Surgical History  thyroidectomy, complete s/p goiter  

 

 Surgical History  left salivary gland removed due to stones/sludge   

 

 Surgical History  right carpal tunnel release   

 

 Surgical History  right ulnar nerve release with piece of elbow removed   

 

 Surgical History  right lower lobectomy: lung s/p benign cyst removal  

 

 Surgical History  partial hysterectomy s/p endometriosis  

 

 Surgical History  appendectomy  

 

 Surgical History  cholecystectomy  

 

 Surgical History  left knee arthroscopy x 3, reconstructed x 1   

 

 Surgical History  right knee arthroscopy x 3, reconstructed x 1   

 

 Surgical History  lumbar surgery  2014

 

 Surgical History  Perforated Gastric Ulcer  2016

 

 Surgical History  Hernia repair by   17

 

 Hospitalization History  childbirth, surgeries   

 

 Hospitalization History  syncope with collapse, dx with hyponatremia, 
hypopotassemia  2015

 

 Hospitalization History  Perforated Gastric Ulcer-Pt voices in hp 21 days  2016

## 2018-05-14 NOTE — XMS REPORT
Continuity of Care Document

 Created on: 2018



JEFF PICKARD

External Reference #: [id number]

: 1955

Sex: Female



Demographics







 Address  [address]

[place]  [address]

 

 Home Phone  [phone] x

 

 Preferred Language  Unknown

 

 Marital Status  Unknown

 

 Confucianist Affiliation  Unknown

 

 Race  Unknown

 

 Ethnic Group  Unknown





Author







 Author  [facility]

 

 Organization  [facility]

 

 Address  Unknown

 

 Phone  Unavailable



              



Allergies

      





 Active            Description            Code            Type            
Severity            Reaction            Onset            Reported/Identified   
         Relationship to Patient            Clinical Status        

 

 Yes            morphine            [id number]            Drug Allergy         
   Moderate            NAUSEA/VOMITTIN                         2017      
                            

 

 Yes            aspirin            [id number]            Drug Allergy          
  Mild            N/A                         2017                       
           

 

 Yes            ketorolac            [id number]            Drug Allergy        
    Mild            N/A                         2017                     
             

 

 Yes            pentazocine            [id number]            Drug Allergy      
      Mild            N/A                         2017                   
               

 

 Yes            diazepam            [id number]            Drug Allergy         
   Unknown            N/A                         2017                   
               

 

 Yes            duloxetine            [id number]            Drug Allergy       
     Unknown            HIVES AND BEHAV                         2017     
                             

 

 Yes            methimazole            [id number]            Drug Allergy      
      Unknown            N/A                         2017                
                  

 

 Yes            nalbuphine            [id number]            Drug Allergy       
     Unknown            HIVES                         2017               
                   

 

 Yes            prednisone            [id number]            Drug Allergy       
     Unknown            N/A                         2017                 
                 

 

 Yes            propoxyphene            [id number]            Drug Allergy     
       Unknown            N/A                         2017               
                   

 

 Yes            topiramate            [id number]            Drug Allergy       
     Unknown            MOUTH SWELLING                          2017     
                             

 

 Yes            tramadol HCl            [id number]            Drug Allergy     
       Unknown            RASH                         2017              
                    



                                        



Medications

      



There is no data.                  



Problems

      





 Date Dx Coded            Attending            Type            Code            
Diagnosis            Diagnosed By        

 

 2012                         Ot            724.2            LUMBAGO     
                

 

 2012                         Ot            724.3            SCIATICA    
                 

 

 2012                         Ot            780.2            SYNCOPE AND 
COLLAPSE                     

 

 2012                         Ot            959.01            HEAD INJURY
, NOS                     

 

 2012                         Ot            E000.0            CIVILIAN 
ACTIVITY DONE FOR INCOME OR PAY                     

 

 2012                         Ot            E849.7            ACCID IN 
RESIDENT INSTIT                     

 

 2012                         Ot            E888.9            FALL NOS   
                  

 

 2012                         Ot            724.2            LUMBAGO     
                

 

 2012                         Ot            729.5            PAIN IN LIMB
                     

 

 2012                         Ot            V57.1            PHYSICAL 
THERAPY NEC                     

 

 2013            SCHEBENEZER FERNANDEZ MD            Ot            041.02    
        BACTERIAL INFECTION DUE TO STREPTOCOCCUS                     

 

 2013            EBENEZER ROSAS MD            Ot            276.51    
        DEHYDRATION                     

 

 2013            EBENEZER ROSAS MD            Ot            300.00    
        ANXIETY STATE NOS                     

 

 2013            EBENEZER ROSAS MD            Ot            305.1     
       TOBACCO USE DISORDER                     

 

 2013            EBENEZER ROSAS MD            Ot            311       
     DEPRESSIVE DISORDER NEC                     

 

 2013            EBENEZER ROSAS MD            Ot            338.29    
        OTHER CHRONIC PAIN                     

 

 2013            EBENEZER ROSAS MD            Ot            530.81    
        ESOPHAGEAL REFLUX                     

 

 2013            EBENEZER ROSAS MD            Ot            558.9     
       NONINF GASTROENTERIT NEC                     

 

 2013            EBENEZER ROSAS MD            Ot            560.9     
       INTESTINAL OBSTRUCT NOS                     

 

 2013            EBENEZER ROSAS MD            Ot            564.00    
        UNSPEC CONSTIPATION                     

 

 2013            EBENEZER ROSAS MD            Ot            714.0     
       RHEUMATOID ARTHRITIS                     

 

 2013            EBENEZER ROSAS MD            Ot            724.2     
       LUMBAGO                     

 

 2014            REJI HARO            Ot            724.2    
        LUMBAGO                     

 

 2014            REJI HARO            Ot            724.4    
        LUMBOSACRAL NEURITIS NOS                     

 

 2014                         Ot            719.45                       
           

 

 2014                         Ot            722.52                       
           

 

 2014                         Ot            719.45                       
           

 

 2014                         Ot            722.52                       
           

 

 2014                         Ot            780.79                       
           

 

 2014                         Ot            782.7                        
          

 

 2014                         Ot            276.1                        
          

 

 2014                         Ot            780.79                       
           

 

 2014                         Ot            782.3                        
          

 

 2014                         Ot            790.29                       
           

 

 2014            EBENEZER ROSAS MD            Ot            789.00    
                              

 

 2014            EBENEZER ROSAS MD            Ot            793.19    
                              

 

 2014            REJI HARO            Ot            724.4    
                              

 

 2014            RELL CHRISTIANSON MD            Ot            722.10       
                           

 

 2014            RELL CHRISTIANSON MD            Ot            V72.63       
                           

 

 2014            RELL CHRISTIANSON MD            Ot            V74.8        
                          

 

 2014            EBENEZER ROSAS MD            Ot            789.00    
                              

 

 2014            EBENEZER ROSAS MD            Ot            793.19    
                              

 

 2014            REJI HARO            Ot            724.4    
                              

 

 2015                         Ot            719.45                       
           

 

 2015                         Ot            722.52                       
           

 

 2015                         Ot            780.79                       
           

 

 2015                         Ot            782.7                        
          

 

 2015                         Ot            276.1                        
          

 

 2015                         Ot            780.79                       
           

 

 2015                         Ot            782.3                        
          

 

 2015                         Ot            790.29                       
           

 

 2015            EBENEZER ROSAS MD            Ot            789.00    
                              

 

 2015            EBENEZER ROSAS MD            Ot            793.19    
                              

 

 2015            REJI HARO            Ot            724.4    
                              

 

 2015            RELL CHRISTIANSON MD            Ot            722.10       
                           

 

 2015            RELL CHRISTIANSON MD            Ot            V72.63       
                           

 

 2015            RELL CHRISTIANSON MD            Ot            V74.8        
                          

 

 2015                         Ot            719.45                       
           

 

 2015                         Ot            722.52                       
           

 

 2015                         Ot            780.79                       
           

 

 2015                         Ot            782.7                        
          

 

 2015                         Ot            276.1                        
          

 

 2015                         Ot            780.79                       
           

 

 2015                         Ot            782.3                        
          

 

 2015                         Ot            790.29                       
           

 

 2015            EBENEZER ROSAS MD            Ot            789.00    
                              

 

 2015            EBENEZER ROSAS MD            Ot            793.19    
                              

 

 2015            REJI HARO            Ot            724.4    
                              

 

 2015            RELL CHRISTIANSON MD            Ot            722.10       
                           

 

 2015            RELL CHRISTIANSON MD            Ot            V72.63       
                           

 

 2015            RELL CHRISTIANSON MD            Ot            V74.8        
                          

 

 2015            RELL CHRISTIANSON MD            Ot            722.10       
                           

 

 2015            RELL CHRISTIANSON MD            Ot            V72.63       
                           

 

 2015            RELL CHRISTIANSON MD            Ot            V74.8        
                          

 

 2015            RELL CHRISTIANSON MD            Ot            722.10       
                           

 

 2015            RELL CHRISTIANSON MD            Ot            V72.63       
                           

 

 2015            RELL CHRISTIANSON MD            Ot            V74.8        
                          

 

 2015            EBENEZER ROSAS MD            Ot            244.9     
       HYPOTHYROIDISM NOS                     

 

 2015            EBENEZER ROSAS MD            Ot            275.2     
       DIS MAGNESIUM METABOLISM                     

 

 2015            EBENEZER ROSAS MD            Ot            276.8     
       HYPOPOTASSEMIA                     

 

 2015            EBENEZER ROSAS MD            Ot            305.1     
       TOBACCO USE DISORDER                     

 

 2015            EBENEZER ROSAS MD            Ot            458.9     
       HYPOTENSION NOS                     

 

 2015            EBENEZER ROSAS MD            Ot            584.9     
       ACUTE RENAL FAILURE, UNSPECIFIED                     

 

 2015            EBENEZER ROSAS MD            Ot            599.0     
       URIN TRACT INFECTION NOS                     

 

 2015            EBENEZER ROSAS MD            Ot            780.52    
        INSOMNIA, UNSPECIFIED                     

 

 2015            EBENEZER ROSAS MD            Ot            845.00    
        SPRAIN OF ANKLE NOS                     

 

 2015            EBENEZER ROSAS MD            Ot            920       
     CONTUSION FACE/SCALP/NCK                     

 

 2015            EBENEZER ROSAS MD            Ot            E000.8    
        OTHER EXTERNAL CAUSE STATUS                     

 

 2015            EEBNEZER ROSAS MD            Ot            E849.0    
        ACCIDENT IN HOME                     

 

 2015            EBENEZER ROSAS MD            Ot            E888.9    
        FALL NOS                     

 

 2015                         Ot            719.45                       
           

 

 2015                         Ot            722.52                       
           

 

 2015                         Ot            780.79                       
           

 

 2015                         Ot            782.7                        
          

 

 2015                         Ot            276.1                        
          

 

 2015                         Ot            780.79                       
           

 

 2015                         Ot            782.3                        
          

 

 2015                         Ot            790.29                       
           

 

 2015            EBENEZER ROSAS MD            Ot            789.00    
                              

 

 2015            EBENEZER ROSAS MD            Ot            793.19    
                              

 

 2015            REJI HARO            Ot            724.4    
                              

 

 2015            RELL CHRISTIANSON MD            Ot            722.10       
                           

 

 2015            RELL CHRISTIANSON MD            Ot            V72.63       
                           

 

 2015            RELL CHRISTIANSON MD            Ot            V74.8        
                          

 

 2015            EBENEZER ROSAS MD            Ot            276.50    
                              

 

 2015                         Ot            719.45                       
           

 

 2015                         Ot            722.52                       
           

 

 2015                         Ot            780.79                       
           

 

 2015                         Ot            782.7                        
          

 

 2015                         Ot            276.1                        
          

 

 2015                         Ot            780.79                       
           

 

 2015                         Ot            782.3                        
          

 

 2015                         Ot            790.29                       
           

 

 2015            RALPH ORTIZ, EBENEZER BATISTA            Ot            789.00    
                              

 

 2015            RALPH ORTIZ, EBENEZER BATISTA            Ot            793.19    
                              

 

 2015            REJI HARO            Ot            724.4    
                              

 

 2015            RELL CHRISTIANSON MD            Ot            722.10       
                           

 

 2015            RELL CHRISTIANSON MD            Ot            V72.63       
                           

 

 2015            RELL CHRISTIANSON MD            Ot            V74.8        
                          

 

 2015            EBENEZER ROSAS MD            Ot            276.50    
                              

 

 2015            EBENEZER ROSAS MD            Ot            276.50    
                              

 

 2015            EBENEZER ROSAS MD            Ot            276.50    
                              

 

 2015            RELL CHRISTIANSON MD            Ot            722.10       
                           

 

 2015            RELL CHRISTIANSON MD            Ot            V72.63       
                           

 

 2015            RELL CHRISTIANSON MD            Ot            V74.8        
                          

 

 2015            REJI HARO            Ot            724.4    
                              

 

 2015            RELL CHRISTIANSON MD            Ot            722.10       
                           

 

 2015            RELL CHRISTIANSON MD            Ot            V72.63       
                           

 

 2015            RELL CHRISTIANSON MD            Ot            V74.8        
                          

 

 2015            EBENEZER ROSAS MD            Ot            789.00    
                              

 

 2015            EBENEZER ROSAS MD            Ot            793.19    
                              

 

 2015            EBENEZER ROSAS MD            Ot            789.00    
                              

 

 2015            EBENEZER ROSAS MD            Ot            793.19    
                              

 

 2015            REJI HARO            Ot            724.4    
                              

 

 2015            RELL CHRISTIANSON MD            Ot            722.10       
                           

 

 2015            RELL CHRISTIANSON MD            Ot            V72.63       
                           

 

 2015            RELL CHRISTIANSON MD            Ot            V74.8        
                          

 

 2015            EBENEZER ROSAS MD            Ot            276.50    
                              

 

 2015            JEAN PAUL JARRELL APRN            Ot            599.0      
      URIN TRACT INFECTION NOS                     

 

 2015            JEAN PAUL JARRELL APRN            Ot            784.0      
      HEADACHE                     

 

 2015            JEAN PAUL JARRELL APRN            Ot            787.03     
       VOMITING ALONE                     

 

 2015                         Ot            719.45                       
           

 

 2015                         Ot            722.52                       
           

 

 2015                         Ot            780.79                       
           

 

 2015                         Ot            782.7                        
          

 

 2015                         Ot            276.1                        
          

 

 2015                         Ot            780.79                       
           

 

 2015                         Ot            782.3                        
          

 

 2015                         Ot            790.29                       
           

 

 2015            EBENEZER ROSAS MD            Ot            789.00    
                              

 

 2015            EBENEZER ROSAS MD            Ot            793.19    
                              

 

 2015            REJI HARO            Ot            724.4    
                              

 

 2015            RELL CHRISTIANSON MD            Ot            722.10       
                           

 

 2015            RELL CHRISTIANSON MD            Ot            V72.63       
                           

 

 2015            RELL CHRISTIANSON MD            Ot            V74.8        
                          

 

 2015            EBENEZER ROSAS MD            Ot            276.50    
                              

 

 2015                         Ot            719.45                       
           

 

 2015                         Ot            722.52                       
           

 

 2015                         Ot            780.79                       
           

 

 2015                         Ot            782.7                        
          

 

 2015                         Ot            276.1                        
          

 

 2015                         Ot            780.79                       
           

 

 2015                         Ot            782.3                        
          

 

 2015                         Ot            790.29                       
           

 

 2015            EBENEZER ROSAS MD            Ot            789.00    
                              

 

 2015            EBENEZER ROSAS MD            Ot            793.19    
                              

 

 2015            REJI HARO            Ot            724.4    
                              

 

 2015            RELL CHRISTIANSON MD            Ot            722.10       
                           

 

 2015            RELL CHRISTIANSON MD            Ot            V72.63       
                           

 

 2015            RELL CHRISTIANSON MD            Ot            V74.8        
                          

 

 2015            EBENEZER ROSAS MD            Ot            276.50    
                              

 

 2016                         Ot            719.45            JOINT PAIN-
PELVIS                     

 

 2016                         Ot            722.52            LUMB/
LUMBOSAC DISC DEGEN                     

 

 2016                         Ot            780.79            OTH MALAISE 
FATIGUE                     

 

 2016                         Ot            782.7            SPONTANEOUS 
ECCHYMOSES                     

 

 2016                         Ot            276.1            
HYPOSMOLALITY                     

 

 2016                         Ot            780.79            OTH MALAISE 
FATIGUE                     

 

 2016                         Ot            782.3            EDEMA       
              

 

 2016                         Ot            790.29            OTHER 
ABNORMAL GLUCOSE                     

 

 2016            EBENEZER ROSAS MD            Ot            789.00    
        ABDOMINAL PAIN, UNSPECIFIED SITE                     

 

 2016            EBENEZER ROSAS MD            Ot            793.19    
        OTHER NONSPECIFIC ABNORMAL FINDING OF SAMARIA                     

 

 2016            REJI HARO            Ot            724.4    
        LUMBOSACRAL NEURITIS NOS                     

 

 2016            RELL CHRISTIANSON MD            Ot            722.10       
     LUMBAR DISC DISPLACEMENT                     

 

 2016            RELL CHRISTIANSON MD            Ot            V72.63       
     PRE-PROCEDURAL LABORATORY EXAMINATION                     

 

 2016            RELL CHRISTIANSON MD            Ot            V74.8        
    SCREEN-BACTERIAL DIS NEC                     

 

 2016            EBENEZER ROSAS MD            Ot            276.50    
        VOLUME DEPLETION, UNSPECIFIED                     

 

 2016                         Ot            719.45            JOINT PAIN-
PELVIS                     

 

 2016                         Ot            722.52            LUMB/
LUMBOSAC DISC DEGEN                     

 

 2016                         Ot            780.79            OTH MALAISE 
FATIGUE                     

 

 2016                         Ot            782.7            SPONTANEOUS 
ECCHYMOSES                     

 

 2016                         Ot            276.1            
HYPOSMOLALITY                     

 

 2016                         Ot            780.79            OTH MALAISE 
FATIGUE                     

 

 2016                         Ot            782.3            EDEMA       
              

 

 2016                         Ot            790.29            OTHER 
ABNORMAL GLUCOSE                     

 

 2016            EBENEZER ROSAS MD            Ot            789.00    
        ABDOMINAL PAIN, UNSPECIFIED SITE                     

 

 2016            EBENEZER ROSAS MD            Ot            793.19    
        OTHER NONSPECIFIC ABNORMAL FINDING OF SAMARIA                     

 

 2016            REJI HARO            Ot            724.4    
        LUMBOSACRAL NEURITIS NOS                     

 

 2016            RELL CHRISTIANSON MD            Ot            722.10       
     LUMBAR DISC DISPLACEMENT                     

 

 2016            RELL CHRISTIANSON MD            Ot            V72.63       
     PRE-PROCEDURAL LABORATORY EXAMINATION                     

 

 2016            RELL CHRISTIANSON MD            Ot            V74.8        
    SCREEN-BACTERIAL DIS NEC                     

 

 2016            EBENEZER ROSAS MD            Ot            276.50    
        VOLUME DEPLETION, UNSPECIFIED                     

 

 2016            ABISAI MOORE MD            Ot            B95.1         
   STREPTOCOCCUS, GROUP B, CAUSING DISEASES                     

 

 2016            ABISAI MOORE MD            Ot            B96.20        
    UNSP ESCHERICHIA COLI AS THE CAUSE OF DI                     

 

 2016            ABISAI MOORE MD            Ot            D50.0         
   IRON DEFICIENCY ANEMIA SECONDARY TO BLOO                     

 

 2016            ABISAI MOORE MD, Ot            E83.39        
    OTHER DISORDERS OF PHOSPHORUS METABOLISM                     

 

 2016            ABISAI MOORE MD, Ot            E83.42        
    HYPOMAGNESEMIA                     

 

 2016            ABISAI MOORE MD, Ot            E87.6         
   HYPOKALEMIA                     

 

 2016            ABISAI MOORE MD, Ot            E89.0         
   POSTPROCEDURAL HYPOTHYROIDISM                     

 

 2016            ABISAI MOORE MD, Ot            F17.210       
     NICOTINE DEPENDENCE, CIGARETTES, UNCOMPL                     

 

 2016            ABISAI MOORE MD, Ot            F32.9         
   MAJOR DEPRESSIVE DISORDER, SINGLE EPISOD                     

 

 2016            ABISAI MOORE MD, Ot            F41.9         
   ANXIETY DISORDER, UNSPECIFIED                     

 

 2016            ABISAI MOORE MD, Ot            I10            
ESSENTIAL (PRIMARY) HYPERTENSION                     

 

 2016            ABISAI MOORE MD, Ot            K21.0         
   GASTRO-ESOPHAGEAL REFLUX DISEASE WITH ES                     

 

 2016            ABISAI MOORE MD, Ot            K25.2         
   ACUTE GASTRIC ULCER WITH BOTH HEMORRHAGE                     

 

 2016            ABISAI MOORE MD, Ot            K25.6         
   CHRONIC OR UNSP GASTRIC ULCER W BOTH HEM                     

 

 2016            ABISAI MOORE MD, Ot            K31.6         
   FISTULA OF STOMACH AND DUODENUM                     

 

 2016            ABISAI MOORE MD, Ot            M06.9         
   RHEUMATOID ARTHRITIS, UNSPECIFIED                     

 

 2016            ABISAI MOORE MD, Ot            M19.90        
    UNSPECIFIED OSTEOARTHRITIS, UNSPECIFIED                      

 

 2016            ABISAI MOORE MD, Ot            M54.16        
    RADICULOPATHY, LUMBAR REGION                     

 

 2016            ABISAI MOORE MD, Ot            N39.0         
   URINARY TRACT INFECTION, SITE NOT SPECIF                     

 

 2017                         Ot            719.45            JOINT PAIN-
PELVIS                     

 

 2017                         Ot            722.52            LUMB/
LUMBOSAC DISC DEGEN                     

 

 2017                         Ot            780.79            OTH MALAISE 
FATIGUE                     

 

 2017                         Ot            782.7            SPONTANEOUS 
ECCHYMOSES                     

 

 2017                         Ot            276.1            
HYPOSMOLALITY                     

 

 2017                         Ot            780.79            OTH MALAISE 
FATIGUE                     

 

 2017                         Ot            782.3            EDEMA       
              

 

 2017                         Ot            790.29            OTHER 
ABNORMAL GLUCOSE                     

 

 2017            RALPH ORTIZ, EBENEZER BATISTA            Ot            789.00    
        ABDOMINAL PAIN, UNSPECIFIED SITE                     

 

 2017            RALPH ORTIZ, EBENEZER BATISTA            Ot            793.19    
        OTHER NONSPECIFIC ABNORMAL FINDING OF SAMARIA                     

 

 2017            REJI HARO            Ot            724.4    
        LUMBOSACRAL NEURITIS NOS                     

 

 2017            MEGHAN ORTIZ, RELL THOMSON            Ot            722.10       
     LUMBAR DISC DISPLACEMENT                     

 

 2017            MEGHAN ORTIZ, RELL THOMSON            Ot            V72.63       
     PRE-PROCEDURAL LABORATORY EXAMINATION                     

 

 2017            MEGHAN ORTIZ, RELL THOMSON            Ot            V74.8        
    SCREEN-BACTERIAL DIS NEC                     

 

 2017            RALPH ORTIZ, EBENEZER BATISTA            Ot            276.50    
        VOLUME DEPLETION, UNSPECIFIED                     

 

 2017            DEGRAFFENREID-NICHOLE, BENOIT L            Ot            
R10.33            PERIUMBILICAL PAIN                     

 

 2017            JEAN PAUL JARRELL            Ot            E87.8      
      OTH DISORDERS OF ELECTROLYTE AND FLUID B                     

 

 2017            JEAN PAUL JARRELL            Ot            F17.210    
        NICOTINE DEPENDENCE, CIGARETTES, UNCOMPL                     

 

 2017            JEAN PAUL JARRELL            Ot            I10        
    ESSENTIAL (PRIMARY) HYPERTENSION                     

 

 2017            JEAN PAUL JARRELL            Ot            N39.0      
      URINARY TRACT INFECTION, SITE NOT SPECIF                     

 

 2017            JEAN PAUL JARRELL            Ot            R11.2      
      NAUSEA WITH VOMITING, UNSPECIFIED                     

 

 2017            JEAN PAUL JARRELL            Ot            Z79.82     
       LONG TERM (CURRENT) USE OF ASPIRIN                     

 

 2017            JEAN PAUL JARRELL            Ot            Z79.899    
        OTHER LONG TERM (CURRENT) DRUG THERAPY                     

 

 2017            JEAN PAUL JARRELL            Ot            E87.8      
      OTH DISORDERS OF ELECTROLYTE AND FLUID B                     

 

 2017            JEAN PAUL JARRELL            Ot            F17.210    
        NICOTINE DEPENDENCE, CIGARETTES, UNCOMPL                     

 

 2017            JEAN PAUL JARRELL APRGEORGE            Ot            I10        
    ESSENTIAL (PRIMARY) HYPERTENSION                     

 

 2017            JEAN PAUL JARRELL            Ot            N39.0      
      URINARY TRACT INFECTION, SITE NOT SPECIF                     

 

 2017            JEAN PAUL JARRELL            Ot            R11.2      
      NAUSEA WITH VOMITING, UNSPECIFIED                     

 

 2017            JEAN PAUL JARRELL            Ot            Z79.82     
       LONG TERM (CURRENT) USE OF ASPIRIN                     

 

 2017            JEAN PAUL JARRELL APRGEORGE            Ot            Z79.899    
        OTHER LONG TERM (CURRENT) DRUG THERAPY                     

 

 2017            DEGRAFFENREID-NICHOLE, BENOIT L            Ot            
K43.2            INCISIONAL HERNIA WITHOUT OBSTRUCTION OR                     

 

 2017            JEAN PAUL JARRELL APRN            Ot            E87.8      
      OTH DISORDERS OF ELECTROLYTE AND FLUID B                     

 

 2017            JEAN PAUL JARRELL APRN            Ot            F17.210    
        NICOTINE DEPENDENCE, CIGARETTES, UNCOMPL                     

 

 2017            JEAN PAUL JARRELL APRN            Ot            I10        
    ESSENTIAL (PRIMARY) HYPERTENSION                     

 

 2017            JEAN PAUL JARRELL APRN            Ot            N39.0      
      URINARY TRACT INFECTION, SITE NOT SPECIF                     

 

 2017            JEAN PAUL JARRELL APRN            Ot            R11.2      
      NAUSEA WITH VOMITING, UNSPECIFIED                     

 

 2017            JEAN PAUL JARRELL APRN            Ot            Z79.82     
       LONG TERM (CURRENT) USE OF ASPIRIN                     

 

 2017            JEAN PAUL JARRELL APRN            Ot            Z79.899    
        OTHER LONG TERM (CURRENT) DRUG THERAPY                     

 

 2017            DEGRAFFENREID-NICHOLE, BENOIT L            Ot            
K43.2            INCISIONAL HERNIA WITHOUT OBSTRUCTION OR                     

 

 2017            DEGRAFFENREID-NICHOLE BENOIT L            Ot            
R10.33            PERIUMBILICAL PAIN                     

 

 2017            DEGRAFFENREID-NICHOLE, BENOIT L            Ot            
K43.2            INCISIONAL HERNIA WITHOUT OBSTRUCTION OR                     

 

 2017            ABISAI MOORE MD, Ot            K43.9         
   VENTRAL HERNIA WITHOUT OBSTRUCTION OR GA                     

 

 2017            ABISAI MOORE MD            Ot            Z01.812       
     ENCOUNTER FOR PREPROCEDURAL LABORATORY E                     

 

 2017            ABISAI MOORE MD            Ot            Z11.2         
   ENCOUNTER FOR SCREENING FOR OTHER BACTER                     

 

 2017            ABISAI MOORE MD, Ot            K43.9         
   VENTRAL HERNIA WITHOUT OBSTRUCTION OR GA                     

 

 2017            ABISAI MOORE MD            Ot            Z01.812       
     ENCOUNTER FOR PREPROCEDURAL LABORATORY E                     

 

 2017            ABISAI MOORE MD            Ot            Z11.2         
   ENCOUNTER FOR SCREENING FOR OTHER BACTER                     

 

 2017            ABISAI MOORE MD, Ot            E03.9         
   HYPOTHYROIDISM, UNSPECIFIED                     

 

 2017            ABISAI MOORE MD            Ot            F17.210       
     NICOTINE DEPENDENCE, CIGARETTES, UNCOMPL                     

 

 2017            ABISAI MOORE MD            Ot            F32.9         
   MAJOR DEPRESSIVE DISORDER, SINGLE EPISOD                     

 

 2017            ABISAI MOORE MD, Ot            F41.9         
   ANXIETY DISORDER, UNSPECIFIED                     

 

 2017            ABISAI MOORE MD            Ot            I10            
ESSENTIAL (PRIMARY) HYPERTENSION                     

 

 2017            ABISAI MOORE MD, Ot            K21.9         
   GASTRO-ESOPHAGEAL REFLUX DISEASE WITHOUT                     

 

 2017            ABISAI MOORE MD            Ot            K43.2         
   INCISIONAL HERNIA WITHOUT OBSTRUCTION OR                     

 

 2017            ABISAI MOORE MD            Ot            Z79.899       
     OTHER LONG TERM (CURRENT) DRUG THERAPY                     

 

 2017            ABISAI MOORE MD            Ot            E03.9         
   HYPOTHYROIDISM, UNSPECIFIED                     

 

 2017            ABISAI MOORE MD            Ot            F17.210       
     NICOTINE DEPENDENCE, CIGARETTES, UNCOMPL                     

 

 2017            ABISAI MOORE MD, Ot            F32.9         
   MAJOR DEPRESSIVE DISORDER, SINGLE EPISOD                     

 

 2017            ABISAI MOORE MD, Ot            F41.9         
   ANXIETY DISORDER, UNSPECIFIED                     

 

 2017            ABISAI MOORE MD            Ot            I10            
ESSENTIAL (PRIMARY) HYPERTENSION                     

 

 2017            ABISAI MOORE MD            Ot            K21.9         
   GASTRO-ESOPHAGEAL REFLUX DISEASE WITHOUT                     

 

 2017            ABISAI MOORE MD            Ot            K43.2         
   INCISIONAL HERNIA WITHOUT OBSTRUCTION OR                     

 

 2017            ABISAI MOORE MD            Ot            Z79.899       
     OTHER LONG TERM (CURRENT) DRUG THERAPY                     

 

 2017            ABISAI MOORE MD            Ot            E03.9         
   HYPOTHYROIDISM, UNSPECIFIED                     

 

 2017            ABISAI MOORE MD            Ot            F17.210       
     NICOTINE DEPENDENCE, CIGARETTES, UNCOMPL                     

 

 2017            ABISAI MOORE MD, Ot            F32.9         
   MAJOR DEPRESSIVE DISORDER, SINGLE EPISOD                     

 

 2017            ABISAI MOORE MD            Ot            F41.9         
   ANXIETY DISORDER, UNSPECIFIED                     

 

 2017            ABISAI MOORE MD            Ot            I10            
ESSENTIAL (PRIMARY) HYPERTENSION                     

 

 2017            ABISAI MOORE MD            Ot            K21.9         
   GASTRO-ESOPHAGEAL REFLUX DISEASE WITHOUT                     

 

 2017            ABISAI MOORE MD            Ot            K43.2         
   INCISIONAL HERNIA WITHOUT OBSTRUCTION OR                     

 

 2017            ABISAI MOORE MD            Ot            Z79.899       
     OTHER LONG TERM (CURRENT) DRUG THERAPY                     

 

 2017            ABISAI MOORE MD            Ot            E03.9         
   HYPOTHYROIDISM, UNSPECIFIED                     

 

 2017            ABISAI MOORE MD            Ot            F17.210       
     NICOTINE DEPENDENCE, CIGARETTES, UNCOMPL                     

 

 2017            ABISAI MOORE MD            Ot            F32.9         
   MAJOR DEPRESSIVE DISORDER, SINGLE EPISOD                     

 

 2017            ABISAI MOORE MD            Ot            F41.9         
   ANXIETY DISORDER, UNSPECIFIED                     

 

 2017            ABISAI MOORE MD            Ot            I10            
ESSENTIAL (PRIMARY) HYPERTENSION                     

 

 2017            ABISAI MOORE MD            Ot            K21.9         
   GASTRO-ESOPHAGEAL REFLUX DISEASE WITHOUT                     

 

 2017            ABISAI MOORE MD            Ot            K43.2         
   INCISIONAL HERNIA WITHOUT OBSTRUCTION OR                     

 

 2017            AIBSAI MOORE MD            Ot            Z79.899       
     OTHER LONG TERM (CURRENT) DRUG THERAPY                     

 

 2018            CHESTER ROJAS MD            Ot            E03.9       
     HYPOTHYROIDISM, UNSPECIFIED                     

 

 2018            CHESTER ROJAS MD            Ot            F17.210     
       NICOTINE DEPENDENCE, CIGARETTES, UNCOMPL                     

 

 2018            CHESTER ROJAS MD            Ot            F32.9       
     MAJOR DEPRESSIVE DISORDER, SINGLE EPISOD                     

 

 2018            CHESTER ROJAS MD            Ot            G40.909     
       EPILEPSY, UNSP, NOT INTRACTABLE, WITHOUT                     

 

 2018            CHESTER ROJAS MD            Ot            G43.909     
       MIGRAINE, UNSP, NOT INTRACTABLE, WITHOUT                     

 

 2018            CHESTER ROJAS MD            Ot            I10         
   ESSENTIAL (PRIMARY) HYPERTENSION                     

 

 2018            CHESTER ROJAS MD            Ot            K21.9       
     GASTRO-ESOPHAGEAL REFLUX DISEASE WITHOUT                     

 

 2018            CHESTER ROJAS MD            Ot            K43.9       
     VENTRAL HERNIA WITHOUT OBSTRUCTION OR GA                     

 

 2018            CHESTER ROJAS MD            Ot            K62.5       
     HEMORRHAGE OF ANUS AND RECTUM                     

 

 2018            CHESTER ROJAS MD            Ot            M06.9       
     RHEUMATOID ARTHRITIS, UNSPECIFIED                     

 

 2018            CHESTER ROJAS MD            Ot            Z79.82      
      LONG TERM (CURRENT) USE OF ASPIRIN                     

 

 2018            CHESTER ROJAS MD            Ot            Z82.49      
      FAMILY HX OF ISCHEM HEART DIS AND OTH DI                     

 

 2018            CHESTER ROJAS MD            Ot            Z87.19      
      PERSONAL HISTORY OF OTHER DISEASES OF TH                     

 

 2018            CHESTER ROJAS MD            Ot            Z88.5       
     ALLERGY STATUS TO NARCOTIC AGENT STATUS                     

 

 2018            CHESTER ROJAS MD            Ot            Z88.6       
     ALLERGY STATUS TO ANALGESIC AGENT STATUS                     

 

 2018            CHESTER ROJAS MD            Ot            Z88.8       
     ALLERGY STATUS TO OTH DRUG/MEDS/BIOL SUB                     

 

 2018            CHESTER ROJAS MD            Ot            Z90.49      
      ACQUIRED ABSENCE OF OTHER SPECIFIED PART                     

 

 2018            CHESTER ROJAS MD            Ot            Z90.710     
       ACQUIRED ABSENCE OF BOTH CERVIX AND UTER                     

 

 2018            CHESTER ROJAS MD            Ot            Z90.89      
      ACQUIRED ABSENCE OF OTHER ORGANS                     

 

 2018            CHESTER ROJAS MD            Ot            E03.9       
     HYPOTHYROIDISM, UNSPECIFIED                     

 

 2018            CHESTER ROJAS MD            Ot            F17.210     
       NICOTINE DEPENDENCE, CIGARETTES, UNCOMPL                     

 

 2018            CHESTER ROJAS MD            Ot            F32.9       
     MAJOR DEPRESSIVE DISORDER, SINGLE EPISOD                     

 

 2018            CHESTER ROJAS MD            Ot            G40.909     
       EPILEPSY, UNSP, NOT INTRACTABLE, WITHOUT                     

 

 2018            CHESTER ROJAS MD            Ot            G43.909     
       MIGRAINE, UNSP, NOT INTRACTABLE, WITHOUT                     

 

 2018            CHESTER ROJAS MD            Ot            I10         
   ESSENTIAL (PRIMARY) HYPERTENSION                     

 

 2018            CHESTER ROJAS MD            Ot            K21.9       
     GASTRO-ESOPHAGEAL REFLUX DISEASE WITHOUT                     

 

 2018            CHESTER ROJAS MD            Ot            K43.9       
     VENTRAL HERNIA WITHOUT OBSTRUCTION OR GA                     

 

 2018            CHESTER ROJAS MD            Ot            K62.5       
     HEMORRHAGE OF ANUS AND RECTUM                     

 

 2018            CHESTER ROJAS MD            Ot            M06.9       
     RHEUMATOID ARTHRITIS, UNSPECIFIED                     

 

 2018            CHESTER ROJAS MD            Ot            Z79.82      
      LONG TERM (CURRENT) USE OF ASPIRIN                     

 

 2018            CHESTER ROJAS MD            Ot            Z82.49      
      FAMILY HX OF ISCHEM HEART DIS AND OTH DI                     

 

 2018            CHESTER ROJAS MD            Ot            Z87.19      
      PERSONAL HISTORY OF OTHER DISEASES OF TH                     

 

 2018            CHESTER ROJAS MD            Ot            Z88.5       
     ALLERGY STATUS TO NARCOTIC AGENT STATUS                     

 

 2018            CHESTER ROJAS MD            Ot            Z88.6       
     ALLERGY STATUS TO ANALGESIC AGENT STATUS                     

 

 2018            CHESTER ROJAS MD            Ot            Z88.8       
     ALLERGY STATUS TO OTH DRUG/MEDS/BIOL SUB                     

 

 2018            CHESTER ROJAS MD, Ot            Z90.49      
      ACQUIRED ABSENCE OF OTHER SPECIFIED PART                     

 

 2018            CHESTER ROJAS MD, Ot            Z90.710     
       ACQUIRED ABSENCE OF BOTH CERVIX AND UTER                     

 

 2018            CHESTER ROJAS MD, Ot            Z90.89      
      ACQUIRED ABSENCE OF OTHER ORGANS                     



                                                                               
                                                                               
                                                                               
                                                                               
                                                                               
                                                                               
                                                                               
                                                                               
                                                          



Procedures

      





 Code            Description            Performed By            Performed On   
     

 

             [id number]                                  EXCISION OF 
ESOPHAGOGASTRIC JUNCTION, EN                                   2016      
  

 

             [id number]                                  EXCISION OF STOMACH, ENDO
, DIAGN                                   2016        

 

             [id number]                                  EXCISION OF STOMACH, 
PYLORUS, ENDO                                   2016        

 

             [id number]                                  EXCISION OF STOMACH, OPEN 
APPROACH, VERT                                   2016        

 

             [id number]                                  EXCISION OF TRANSVERSE 
COLON, OPEN APPRO                                   2016        

 

             [id number]                                  EXCISION OF MESENTERY, 
OPEN APPROACH                                   2016        



                            



Results

      





 Test            Result            Range        









 Complete blood count (CBC) with automated white blood cell (WBC) differential 
- 16 21:46         









 Blood leukocytes automated count (number/volume)            16.7 10*3/uL      
      4.3-11.0        

 

 Blood erythrocytes automated count (number/volume)            4.05 10*6/uL    
        4.35-5.85        

 

 Venous blood hemoglobin measurement (mass/volume)            11.1 g/dL        
    11.5-16.0        

 

 Blood hematocrit (volume fraction)            33 %            35-52        

 

 Automated erythrocyte mean corpuscular volume            82 [foz_us]          
  80-99        

 

 Automated erythrocyte mean corpuscular hemoglobin (mass per erythrocyte)      
      27 pg            25-34        

 

 Automated erythrocyte mean corpuscular hemoglobin concentration measurement (
mass/volume)            34 g/dL            32-36        

 

 Automated erythrocyte distribution width ratio            13.9 %            
10.0-14.5        

 

 Automated blood platelet count (count/volume)            557 10*3/uL          
  130-400        

 

 Automated blood platelet mean volume measurement            9.0 [foz_us]      
      7.4-10.4        

 

 Automated blood neutrophils/100 leukocytes            80 %            42-75   
     

 

 Automated blood lymphocytes/100 leukocytes            9 %            12-44    
    

 

 Blood monocytes/100 leukocytes            10 %            0-12        

 

 Automated blood eosinophils/100 leukocytes            1 %            0-10     
   

 

 Automated blood basophils/100 leukocytes            0 %            0-10        

 

 Blood neutrophils automated count (number/volume)            13.4 10*3        
    1.8-7.8        

 

 Blood lymphocytes automated count (number/volume)            1.5 10*3         
   1.0-4.0        

 

 Blood monocytes automated count (number/volume)            1.7 10*3            
0.0-1.0        

 

 Automated eosinophil count            0.1 10*3/uL            0.0-0.3        

 

 Automated blood basophil count (count/volume)            0.0 10*3/uL          
  0.0-0.1        









 Comprehensive metabolic panel - 16 21:46         









 Serum or plasma sodium measurement (moles/volume)            137 mmol/L       
     135-145        

 

 Serum or plasma potassium measurement (moles/volume)            3.2 mmol/L    
        3.6-5.0        

 

 Serum or plasma chloride measurement (moles/volume)            99 mmol/L      
              

 

 Carbon dioxide            24 mmol/L            21-32        

 

 Serum or plasma anion gap determination (moles/volume)            14 mmol/L   
         5-14        

 

 Serum or plasma urea nitrogen measurement (mass/volume)            12 mg/dL   
         7-18        

 

 Serum or plasma creatinine measurement (mass/volume)            0.91 mg/dL    
        0.60-1.30        

 

 Serum or plasma urea nitrogen/creatinine mass ratio            13             
NRG        

 

 Serum or plasma creatinine measurement with calculation of estimated 
glomerular filtration rate            >             NRG        

 

 Serum or plasma glucose measurement (mass/volume)            110 mg/dL        
            

 

 Serum or plasma calcium measurement (mass/volume)            9.3 mg/dL        
    8.5-10.1        

 

 Serum or plasma total bilirubin measurement (mass/volume)            0.4 mg/dL
            0.1-1.0        

 

 Serum or plasma alkaline phosphatase measurement (enzymatic activity/volume)  
          125 U/L                    

 

 Serum or plasma aspartate aminotransferase measurement (enzymatic activity/
volume)            12 U/L            5-34        

 

 Serum or plasma alanine aminotransferase measurement (enzymatic activity/volume
)            17 U/L            0-55        

 

 Serum or plasma protein measurement (mass/volume)            7.5 g/dL         
   6.4-8.2        

 

 Serum or plasma albumin measurement (mass/volume)            4.1 g/dL         
   3.2-4.5        









 Magnesium - 16 21:46         









 Magnesium            2.4 mg/dL            1.8-2.4        









 Serum or plasma amylase measurement (enzymatic activity/volume) - 16 21:
46         









 Serum or plasma amylase measurement (enzymatic activity/volume)            19 U
/L                    









 Lipase - 16 21:46         









 Lipase            6 U/L            8-78        









 Complete urinalysis with reflex to culture - 16 22:08         









 Urine color determination            PARKER             NRG        

 

 Urine clarity determination            CLEAR             NRG        

 

 Urine pH measurement by test strip            6             5-9        

 

 Specific gravity of urine by test strip            1.020             1.016-
1.022        

 

 Urine protein assay by test strip, semi-quantitative            2+             
NEGATIVE        

 

 Urine glucose detection by automated test strip            NEGATIVE           
  NEGATIVE        

 

 Erythrocytes detection in urine sediment by light microscopy            2+    
         NEGATIVE        

 

 Urine ketones detection by automated test strip            2+             
NEGATIVE        

 

 Urine nitrite detection by test strip            NEGATIVE             NEGATIVE
        

 

 Urine total bilirubin detection by test strip            1+             
NEGATIVE        

 

 Urine urobilinogen measurement by automated test strip (mass/volume)          
  4 mg/dL            NORMAL        

 

 Urine leukocyte esterase detection by dipstick            3+             
NEGATIVE        

 

 Automated urine sediment erythrocyte count by microscopy (number/high power 
field)             [HPF]            NRG        

 

 Automated urine sediment leukocyte count by microscopy (number/high power field
)             [HPF]            NRG        

 

 Bacteria detection in urine sediment by light microscopy            MODERATE  
           NRG        

 

 Squamous epithelial cells detection in urine sediment by light microscopy     
       2-5             NRG        

 

 Crystals detection in urine sediment by light microscopy            NONE      
       NRG        

 

 Casts detection in urine sediment by light microscopy            NONE         
    NRG        

 

 Mucus detection in urine sediment by light microscopy            SMALL        
     NRG        

 

 Complete urinalysis with reflex to culture            YES             NRG     
   









 Bacterial urine culture - 16 22:08         









 Bacterial urine culture            [id number]             NRG        

 

 COLONY COUNT            >100,000/ML             NRG        

 

 [facility];REPORTABLE            SEE COMMENT             NRG        

 

 URINE CULTURE RESULTS            PLUS             NRG        









 Bacterial susceptibility panel - 16 22:08         









 Gentamicin susceptibility test by minimum inhibitory concentration            <
=            NRG        

 

 Trimethoprim/sulfamethoxazole susceptibility test by minimum 
inhibitoryconcentration            <=            NRG        

 

 Ampicillin susceptibility test by minimum inhibitory concentration            <
=            NRG        

 

 Tobramycin susceptibility test by minimum inhibitory concentration            <
=            NRG        

 

 Cefazolin susceptibility test by minimum inhibitory concentration            <
=            NRG        

 

 Ceftriaxone susceptibility test by minimum inhibitory concentration            
<=            NRG        

 

 Ampicillin/sulbactam susceptibility test by minimum inhibitory concentration  
          <=            NRG        

 

 Piperacillin/tazobactam susceptibility test by minimum inhibitory 
concentration            <=            NRG        

 

 Ciprofloxacin susceptibility test by minimum inhibitory concentration         
   <=            NRG        

 

 Meropenem susceptibility test by minimum inhibitory concentration            <
=            NRG        

 

 Nitrofurantoin susceptibility test by minimum inhibitory concentration        
    <=            NRG        

 

 Aztreonam susceptibility test by minimum inhibitory concentration            <
=            NRG        

 

 Extended spectrum beta lactamase (ESBL) producing bacteria susceptibility test 
by minimum inhibitory concentration            -             NRG        









 Complete blood count (CBC) with automated white blood cell (WBC) differential 
- 16 05:04         









 Blood leukocytes automated count (number/volume)            13.3 10*3/uL      
      4.3-11.0        

 

 Blood erythrocytes automated count (number/volume)            3.49 10*6/uL    
        4.35-5.85        

 

 Venous blood hemoglobin measurement (mass/volume)            9.6 g/dL         
   11.5-16.0        

 

 Blood hematocrit (volume fraction)            29 %            35-52        

 

 Automated erythrocyte mean corpuscular volume            83 [foz_us]          
  80-99        

 

 Automated erythrocyte mean corpuscular hemoglobin (mass per erythrocyte)      
      28 pg            25-34        

 

 Automated erythrocyte mean corpuscular hemoglobin concentration measurement (
mass/volume)            33 g/dL            32-36        

 

 Automated erythrocyte distribution width ratio            13.9 %            
10.0-14.5        

 

 Automated blood platelet count (count/volume)            460 10*3/uL          
  130-400        

 

 Automated blood platelet mean volume measurement            9.2 [foz_us]      
      7.4-10.4        

 

 Automated blood neutrophils/100 leukocytes            71 %            42-75   
     

 

 Automated blood lymphocytes/100 leukocytes            16 %            12-44   
     

 

 Blood monocytes/100 leukocytes            10 %            0-12        

 

 Automated blood eosinophils/100 leukocytes            3 %            0-10     
   

 

 Automated blood basophils/100 leukocytes            0 %            0-10        

 

 Blood neutrophils automated count (number/volume)            9.4 10*3         
   1.8-7.8        

 

 Blood lymphocytes automated count (number/volume)            2.2 10*3         
   1.0-4.0        

 

 Blood monocytes automated count (number/volume)            1.3 10*3            
0.0-1.0        

 

 Automated eosinophil count            0.4 10*3/uL            0.0-0.3        

 

 Automated blood basophil count (count/volume)            0.1 10*3/uL          
  0.0-0.1        









 Comprehensive metabolic panel - 16 05:04         









 Serum or plasma sodium measurement (moles/volume)            139 mmol/L       
     135-145        

 

 Serum or plasma potassium measurement (moles/volume)            3.3 mmol/L    
        3.6-5.0        

 

 Serum or plasma chloride measurement (moles/volume)            107 mmol/L     
               

 

 Carbon dioxide            22 mmol/L            21-32        

 

 Serum or plasma anion gap determination (moles/volume)            10 mmol/L   
         5-14        

 

 Serum or plasma urea nitrogen measurement (mass/volume)            10 mg/dL   
         7-18        

 

 Serum or plasma creatinine measurement (mass/volume)            0.73 mg/dL    
        0.60-1.30        

 

 Serum or plasma urea nitrogen/creatinine mass ratio            14             
NRG        

 

 Serum or plasma creatinine measurement with calculation of estimated 
glomerular filtration rate            >             NRG        

 

 Serum or plasma glucose measurement (mass/volume)            88 mg/dL         
           

 

 Serum or plasma calcium measurement (mass/volume)            7.7 mg/dL        
    8.5-10.1        

 

 Serum or plasma total bilirubin measurement (mass/volume)            0.3 mg/dL
            0.1-1.0        

 

 Serum or plasma alkaline phosphatase measurement (enzymatic activity/volume)  
          96 U/L                    

 

 Serum or plasma aspartate aminotransferase measurement (enzymatic activity/
volume)            11 U/L            5-34        

 

 Serum or plasma alanine aminotransferase measurement (enzymatic activity/volume
)            15 U/L            0-55        

 

 Serum or plasma protein measurement (mass/volume)            5.7 g/dL         
   6.4-8.2        

 

 Serum or plasma albumin measurement (mass/volume)            3.1 g/dL         
   3.2-4.5        









 Automated blood complete blood count (hemogram) panel - 16 05:20         









 Blood leukocytes automated count (number/volume)            10.9 10*3/uL      
      4.3-11.0        

 

 Blood erythrocytes automated count (number/volume)            3.04 10*6/uL    
        4.35-5.85        

 

 Venous blood hemoglobin measurement (mass/volume)            8.1 g/dL         
   11.5-16.0        

 

 Blood hematocrit (volume fraction)            25 %            35-52        

 

 Automated erythrocyte mean corpuscular volume            83 [foz_us]          
  80-99        

 

 Automated erythrocyte mean corpuscular hemoglobin (mass per erythrocyte)      
      27 pg            25-34        

 

 Automated erythrocyte mean corpuscular hemoglobin concentration measurement (
mass/volume)            32 g/dL            32-36        

 

 Automated erythrocyte distribution width ratio            14.0 %            
10.0-14.5        

 

 Automated blood platelet count (count/volume)            430 10*3/uL          
  130-400        

 

 Automated blood platelet mean volume measurement            8.9 [foz_us]      
      7.4-10.4        









 Whole blood basic metabolic panel - 16 05:20         









 Serum or plasma sodium measurement (moles/volume)            136 mmol/L       
     135-145        

 

 Serum or plasma potassium measurement (moles/volume)            2.7 mmol/L    
        3.6-5.0        

 

 Serum or plasma chloride measurement (moles/volume)            110 mmol/L     
               

 

 Carbon dioxide            19 mmol/L            21-32        

 

 Serum or plasma anion gap determination (moles/volume)            7 mmol/L    
        5-14        

 

 Serum or plasma urea nitrogen measurement (mass/volume)            6 mg/dL    
        7-18        

 

 Serum or plasma creatinine measurement (mass/volume)            0.65 mg/dL    
        0.60-1.30        

 

 Serum or plasma urea nitrogen/creatinine mass ratio            9             
NRG        

 

 Serum or plasma creatinine measurement with calculation of estimated 
glomerular filtration rate            >             NRG        

 

 Serum or plasma glucose measurement (mass/volume)            77 mg/dL         
           

 

 Serum or plasma calcium measurement (mass/volume)            7.3 mg/dL        
    8.5-10.1        









 PT panel in platelet poor plasma by coagulation assay - 16 05:20         









 Prothrombin time (PT) in platelet poor plasma by coagulation assay            
14.5 s            12.2-14.7        

 

 INR in platelet poor plasma or blood by coagulation assay            1.2      
       0.8-1.4        









 Serum or plasma phosphate measurement (mass/volume) - 16 05:20         









 Serum or plasma phosphate measurement (mass/volume)            1.8 mg/dL      
      2.3-4.7        









 Magnesium - 16 05:20         









 Magnesium            1.6 mg/dL            1.8-2.4        









 Serum or plasma triglyceride measurement (mass/volume) - 16 05:20        
 









 Serum or plasma triglyceride measurement (mass/volume)            122 mg/dL   
         <150        









 Serum or plasma lithium measurement (moles/volume) - 16 05:20         









 Serum or plasma prealbumin measurement (mass/volume)            5.1 mg/dL     
       18.0-35.7        









 Comprehensive metabolic panel - 16 04:35         









 Serum or plasma sodium measurement (moles/volume)            142 mmol/L       
     135-145        

 

 Serum or plasma potassium measurement (moles/volume)            3.3 mmol/L    
        3.6-5.0        

 

 Serum or plasma chloride measurement (moles/volume)            111 mmol/L     
               

 

 Carbon dioxide            20 mmol/L            21-32        

 

 Serum or plasma anion gap determination (moles/volume)            11 mmol/L   
         5-14        

 

 Serum or plasma urea nitrogen measurement (mass/volume)            2 mg/dL    
        7-18        

 

 Serum or plasma creatinine measurement (mass/volume)            0.58 mg/dL    
        0.60-1.30        

 

 Serum or plasma urea nitrogen/creatinine mass ratio            3             
NRG        

 

 Serum or plasma creatinine measurement with calculation of estimated 
glomerular filtration rate            >             NRG        

 

 Serum or plasma glucose measurement (mass/volume)            77 mg/dL         
           

 

 Serum or plasma calcium measurement (mass/volume)            6.8 mg/dL        
    8.5-10.1        

 

 Serum or plasma total bilirubin measurement (mass/volume)            0.2 mg/dL
            0.1-1.0        

 

 Serum or plasma alkaline phosphatase measurement (enzymatic activity/volume)  
          90 U/L                    

 

 Serum or plasma aspartate aminotransferase measurement (enzymatic activity/
volume)            9 U/L            5-34        

 

 Serum or plasma alanine aminotransferase measurement (enzymatic activity/volume
)            8 U/L            0-55        

 

 Serum or plasma protein measurement (mass/volume)            4.8 g/dL         
   6.4-8.2        

 

 Serum or plasma albumin measurement (mass/volume)            2.5 g/dL         
   3.2-4.5        









 Serum or plasma phosphate measurement (mass/volume) - 16 04:35         









 Serum or plasma phosphate measurement (mass/volume)            2.4 mg/dL      
      2.3-4.7        









 Magnesium - 16 04:35         









 Magnesium            2.0 mg/dL            1.8-2.4        









 Capillary blood glucose measurement by glucometer (mass/volume) - 16 00:
17         









 Capillary blood glucose measurement by glucometer (mass/volume)            108 
mg/dL                    









 Whole blood basic metabolic panel - 16 06:10         









 Serum or plasma sodium measurement (moles/volume)            140 mmol/L       
     135-145        

 

 Serum or plasma potassium measurement (moles/volume)            3.2 mmol/L    
        3.6-5.0        

 

 Serum or plasma chloride measurement (moles/volume)            107 mmol/L     
               

 

 Carbon dioxide            26 mmol/L            21-32        

 

 Serum or plasma anion gap determination (moles/volume)            7 mmol/L    
        5-14        

 

 Serum or plasma urea nitrogen measurement (mass/volume)            5 mg/dL    
        7-18        

 

 Serum or plasma creatinine measurement (mass/volume)            0.62 mg/dL    
        0.60-1.30        

 

 Serum or plasma urea nitrogen/creatinine mass ratio            8             
NRG        

 

 Serum or plasma creatinine measurement with calculation of estimated 
glomerular filtration rate            >             NRG        

 

 Serum or plasma glucose measurement (mass/volume)            106 mg/dL        
            

 

 Serum or plasma calcium measurement (mass/volume)            8.1 mg/dL        
    8.5-10.1        









 Serum or plasma phosphate measurement (mass/volume) - 16 06:10         









 Serum or plasma phosphate measurement (mass/volume)            2.1 mg/dL      
      2.3-4.7        









 Capillary blood glucose measurement by glucometer (mass/volume) - 16 12:
31         









 Capillary blood glucose measurement by glucometer (mass/volume)            123 
mg/dL                    









 Capillary blood glucose measurement by glucometer (mass/volume) - 16 18:
45         









 Capillary blood glucose measurement by glucometer (mass/volume)            92 
mg/dL                    









 Methicillin resistant Staphylococcus aureus (MRSA) screening culture -  23:55         









 Methicillin resistant Staphylococcus aureus (MRSA) screening culture          
  NEG             [facility]        









 Comprehensive metabolic panel - 16 06:40         









 Serum or plasma sodium measurement (moles/volume)            140 mmol/L       
     135-145        

 

 Serum or plasma potassium measurement (moles/volume)            4.0 mmol/L    
        3.6-5.0        

 

 Serum or plasma chloride measurement (moles/volume)            107 mmol/L     
               

 

 Carbon dioxide            25 mmol/L            21-32        

 

 Serum or plasma anion gap determination (moles/volume)            8 mmol/L    
        5-14        

 

 Serum or plasma urea nitrogen measurement (mass/volume)            9 mg/dL    
        7-18        

 

 Serum or plasma creatinine measurement (mass/volume)            0.62 mg/dL    
        0.60-1.30        

 

 Serum or plasma urea nitrogen/creatinine mass ratio            15             
NRG        

 

 Serum or plasma creatinine measurement with calculation of estimated 
glomerular filtration rate            >             NRG        

 

 Serum or plasma glucose measurement (mass/volume)            85 mg/dL         
           

 

 Serum or plasma calcium measurement (mass/volume)            8.3 mg/dL        
    8.5-10.1        

 

 Serum or plasma total bilirubin measurement (mass/volume)            0.2 mg/dL
            0.1-1.0        

 

 Serum or plasma alkaline phosphatase measurement (enzymatic activity/volume)  
          96 U/L                    

 

 Serum or plasma aspartate aminotransferase measurement (enzymatic activity/
volume)            12 U/L            5-34        

 

 Serum or plasma alanine aminotransferase measurement (enzymatic activity/volume
)            10 U/L            0-55        

 

 Serum or plasma protein measurement (mass/volume)            5.5 g/dL         
   6.4-8.2        

 

 Serum or plasma albumin measurement (mass/volume)            3.0 g/dL         
   3.2-4.5        









 Serum or plasma phosphate measurement (mass/volume) - 16 06:40         









 Serum or plasma phosphate measurement (mass/volume)            3.4 mg/dL      
      2.3-4.7        









 Magnesium - 16 06:40         









 Magnesium            1.9 mg/dL            1.8-2.4        









 Serum or plasma triglyceride measurement (mass/volume) - 16 06:40        
 









 Serum or plasma triglyceride measurement (mass/volume)            139 mg/dL   
         <150        









 Serum or plasma lithium measurement (moles/volume) - 16 06:40         









 Serum or plasma prealbumin measurement (mass/volume)            7.8 mg/dL     
       18.0-35.7        









 Automated blood complete blood count (hemogram) panel - 16 10:35         









 Blood leukocytes automated count (number/volume)            8.3 10*3/uL       
     4.3-11.0        

 

 Blood erythrocytes automated count (number/volume)            3.34 10*6/uL    
        4.35-5.85        

 

 Venous blood hemoglobin measurement (mass/volume)            9.0 g/dL         
   11.5-16.0        

 

 Blood hematocrit (volume fraction)            27 %            35-52        

 

 Automated erythrocyte mean corpuscular volume            82 [foz_us]          
  80-99        

 

 Automated erythrocyte mean corpuscular hemoglobin (mass per erythrocyte)      
      27 pg            25-34        

 

 Automated erythrocyte mean corpuscular hemoglobin concentration measurement (
mass/volume)            33 g/dL            32-36        

 

 Automated erythrocyte distribution width ratio            14.4 %            
10.0-14.5        

 

 Automated blood platelet count (count/volume)            595 10*3/uL          
  130-400        

 

 Automated blood platelet mean volume measurement            8.9 [foz_us]      
      7.4-10.4        









 Blood type T Indirect antibody screen panel - 16 12:40         









 ABO+Rh group            AP             NRG        

 

 Transfusion band number            [id number]             NRG        

 

 Blood group antibody screen            NEGATIVE             NRG        









 Automated blood complete blood count (hemogram) panel - 16 08:26         









 Blood leukocytes automated count (number/volume)            12.8 10*3/uL      
      4.3-11.0        

 

 Blood erythrocytes automated count (number/volume)            3.74 10*6/uL    
        4.35-5.85        

 

 Venous blood hemoglobin measurement (mass/volume)            10.0 g/dL        
    11.5-16.0        

 

 Blood hematocrit (volume fraction)            30 %            35-52        

 

 Automated erythrocyte mean corpuscular volume            81 [foz_us]          
  80-99        

 

 Automated erythrocyte mean corpuscular hemoglobin (mass per erythrocyte)      
      27 pg            25-34        

 

 Automated erythrocyte mean corpuscular hemoglobin concentration measurement (
mass/volume)            33 g/dL            32-36        

 

 Automated erythrocyte distribution width ratio            14.6 %            
10.0-14.5        

 

 Automated blood platelet count (count/volume)            638 10*3/uL          
  130-400        

 

 Automated blood platelet mean volume measurement            8.8 [foz_us]      
      7.4-10.4        









 Whole blood basic metabolic panel - 16 08:26         









 Serum or plasma sodium measurement (moles/volume)            136 mmol/L       
     135-145        

 

 Serum or plasma potassium measurement (moles/volume)            4.3 mmol/L    
        3.6-5.0        

 

 Serum or plasma chloride measurement (moles/volume)            103 mmol/L     
               

 

 Carbon dioxide            23 mmol/L            21-32        

 

 Serum or plasma anion gap determination (moles/volume)            10 mmol/L   
         5-14        

 

 Serum or plasma urea nitrogen measurement (mass/volume)            10 mg/dL   
         7-18        

 

 Serum or plasma creatinine measurement (mass/volume)            0.64 mg/dL    
        0.60-1.30        

 

 Serum or plasma urea nitrogen/creatinine mass ratio            16             
NRG        

 

 Serum or plasma creatinine measurement with calculation of estimated 
glomerular filtration rate            >             NRG        

 

 Serum or plasma glucose measurement (mass/volume)            92 mg/dL         
           

 

 Serum or plasma calcium measurement (mass/volume)            8.8 mg/dL        
    8.5-10.1        









 Serum or plasma phosphate measurement (mass/volume) - 16 08:26         









 Serum or plasma phosphate measurement (mass/volume)            2.7 mg/dL      
      2.3-4.7        









 Magnesium - 16 08:26         









 Magnesium            1.6 mg/dL            1.8-2.4        









 Automated blood complete blood count (hemogram) panel - 08/10/16 07:25         









 Blood leukocytes automated count (number/volume)            15.9 10*3/uL      
      4.3-11.0        

 

 Blood erythrocytes automated count (number/volume)            3.40 10*6/uL    
        4.35-5.85        

 

 Venous blood hemoglobin measurement (mass/volume)            9.1 g/dL         
   11.5-16.0        

 

 Blood hematocrit (volume fraction)            28 %            35-52        

 

 Automated erythrocyte mean corpuscular volume            82 [foz_us]          
  80-99        

 

 Automated erythrocyte mean corpuscular hemoglobin (mass per erythrocyte)      
      27 pg            25-34        

 

 Automated erythrocyte mean corpuscular hemoglobin concentration measurement (
mass/volume)            33 g/dL            32-36        

 

 Automated erythrocyte distribution width ratio            14.9 %            
10.0-14.5        

 

 Automated blood platelet count (count/volume)            600 10*3/uL          
  130-400        

 

 Automated blood platelet mean volume measurement            8.8 [foz_us]      
      7.4-10.4        









 Whole blood basic metabolic panel - 08/10/16 07:25         









 Serum or plasma sodium measurement (moles/volume)            135 mmol/L       
     135-145        

 

 Serum or plasma potassium measurement (moles/volume)            3.7 mmol/L    
        3.6-5.0        

 

 Serum or plasma chloride measurement (moles/volume)            103 mmol/L     
               

 

 Carbon dioxide            22 mmol/L            21-32        

 

 Serum or plasma anion gap determination (moles/volume)            10 mmol/L   
         5-14        

 

 Serum or plasma urea nitrogen measurement (mass/volume)            11 mg/dL   
         7-18        

 

 Serum or plasma creatinine measurement (mass/volume)            0.60 mg/dL    
        0.60-1.30        

 

 Serum or plasma urea nitrogen/creatinine mass ratio            18             
NRG        

 

 Serum or plasma creatinine measurement with calculation of estimated 
glomerular filtration rate            >             NRG        

 

 Serum or plasma glucose measurement (mass/volume)            136 mg/dL        
            

 

 Serum or plasma calcium measurement (mass/volume)            8.1 mg/dL        
    8.5-10.1        









 Serum or plasma phosphate measurement (mass/volume) - 08/10/16 07:25         









 Serum or plasma phosphate measurement (mass/volume)            1.7 mg/dL      
      2.3-4.7        









 Magnesium - 08/10/16 07:25         









 Magnesium            2.0 mg/dL            1.8-2.4        









 Automated blood complete blood count (hemogram) panel - 16 05:31         









 Blood leukocytes automated count (number/volume)            15.8 10*3/uL      
      4.3-11.0        

 

 Blood erythrocytes automated count (number/volume)            3.06 10*6/uL    
        4.35-5.85        

 

 Venous blood hemoglobin measurement (mass/volume)            8.1 g/dL         
   11.5-16.0        

 

 Blood hematocrit (volume fraction)            25 %            35-52        

 

 Automated erythrocyte mean corpuscular volume            82 [foz_us]          
  80-99        

 

 Automated erythrocyte mean corpuscular hemoglobin (mass per erythrocyte)      
      27 pg            25-34        

 

 Automated erythrocyte mean corpuscular hemoglobin concentration measurement (
mass/volume)            32 g/dL            32-36        

 

 Automated erythrocyte distribution width ratio            14.9 %            
10.0-14.5        

 

 Automated blood platelet count (count/volume)            530 10*3/uL          
  130-400        

 

 Automated blood platelet mean volume measurement            9.0 [foz_us]      
      7.4-10.4        









 Whole blood basic metabolic panel - 16 05:31         









 Serum or plasma sodium measurement (moles/volume)            135 mmol/L       
     135-145        

 

 Serum or plasma potassium measurement (moles/volume)            4.2 mmol/L    
        3.6-5.0        

 

 Serum or plasma chloride measurement (moles/volume)            106 mmol/L     
               

 

 Carbon dioxide            20 mmol/L            21-32        

 

 Serum or plasma anion gap determination (moles/volume)            9 mmol/L    
        5-14        

 

 Serum or plasma urea nitrogen measurement (mass/volume)            13 mg/dL   
         7-18        

 

 Serum or plasma creatinine measurement (mass/volume)            0.58 mg/dL    
        0.60-1.30        

 

 Serum or plasma urea nitrogen/creatinine mass ratio            22             
NRG        

 

 Serum or plasma creatinine measurement with calculation of estimated 
glomerular filtration rate            >             NRG        

 

 Serum or plasma glucose measurement (mass/volume)            144 mg/dL        
            

 

 Serum or plasma calcium measurement (mass/volume)            8.1 mg/dL        
    8.5-10.1        









 Serum or plasma phosphate measurement (mass/volume) - 16 05:31         









 Serum or plasma phosphate measurement (mass/volume)            3.0 mg/dL      
      2.3-4.7        









 Magnesium - 16 05:31         









 Magnesium            2.0 mg/dL            1.8-2.4        









 Capillary blood glucose measurement by glucometer (mass/volume) - 16 06:
44         









 Capillary blood glucose measurement by glucometer (mass/volume)            142 
mg/dL                    









 Automated blood complete blood count (hemogram) panel - 16 07:54         









 Blood leukocytes automated count (number/volume)            16.0 10*3/uL      
      4.3-11.0        

 

 Blood erythrocytes automated count (number/volume)            3.11 10*6/uL    
        4.35-5.85        

 

 Venous blood hemoglobin measurement (mass/volume)            8.2 g/dL         
   11.5-16.0        

 

 Blood hematocrit (volume fraction)            25 %            35-52        

 

 Automated erythrocyte mean corpuscular volume            81 [foz_us]          
  80-99        

 

 Automated erythrocyte mean corpuscular hemoglobin (mass per erythrocyte)      
      26 pg            25-34        

 

 Automated erythrocyte mean corpuscular hemoglobin concentration measurement (
mass/volume)            33 g/dL            32-36        

 

 Automated erythrocyte distribution width ratio            14.8 %            
10.0-14.5        

 

 Automated blood platelet count (count/volume)            529 10*3/uL          
  130-400        

 

 Automated blood platelet mean volume measurement            8.9 [foz_us]      
      7.4-10.4        









 Whole blood basic metabolic panel - 16 07:54         









 Serum or plasma sodium measurement (moles/volume)            134 mmol/L       
     135-145        

 

 Serum or plasma potassium measurement (moles/volume)            4.4 mmol/L    
        3.6-5.0        

 

 Serum or plasma chloride measurement (moles/volume)            104 mmol/L     
               

 

 Carbon dioxide            21 mmol/L            21-32        

 

 Serum or plasma anion gap determination (moles/volume)            9 mmol/L    
        5-14        

 

 Serum or plasma urea nitrogen measurement (mass/volume)            12 mg/dL   
         7-18        

 

 Serum or plasma creatinine measurement (mass/volume)            0.60 mg/dL    
        0.60-1.30        

 

 Serum or plasma urea nitrogen/creatinine mass ratio            20             
NRG        

 

 Serum or plasma creatinine measurement with calculation of estimated 
glomerular filtration rate            >             NRG        

 

 Serum or plasma glucose measurement (mass/volume)            134 mg/dL        
            

 

 Serum or plasma calcium measurement (mass/volume)            8.4 mg/dL        
    8.5-10.1        









 Complete urinalysis with reflex to culture - 16 12:22         









 Urine color determination            YELLOW             NRG        

 

 Urine clarity determination            CLEAR             NRG        

 

 Urine pH measurement by test strip            7             5-9        

 

 Specific gravity of urine by test strip            1.010             1.016-
1.022        

 

 Urine protein assay by test strip, semi-quantitative            NEGATIVE      
       NEGATIVE        

 

 Urine glucose detection by automated test strip            NEGATIVE           
  NEGATIVE        

 

 Erythrocytes detection in urine sediment by light microscopy            
NEGATIVE             NEGATIVE        

 

 Urine ketones detection by automated test strip            NEGATIVE           
  NEGATIVE        

 

 Urine nitrite detection by test strip            NEGATIVE             NEGATIVE
        

 

 Urine total bilirubin detection by test strip            NEGATIVE             
NEGATIVE        

 

 Urine urobilinogen measurement by automated test strip (mass/volume)          
  NORMAL             NORMAL        

 

 Urine leukocyte esterase detection by dipstick            1+             
NEGATIVE        

 

 Automated urine sediment erythrocyte count by microscopy (number/high power 
field)            NONE             NRG        

 

 Automated urine sediment leukocyte count by microscopy (number/high power field
)             [HPF]            NRG        

 

 Bacteria detection in urine sediment by light microscopy            TRACE     
        NRG        

 

 Squamous epithelial cells detection in urine sediment by light microscopy     
       5-10             NRG        

 

 Crystals detection in urine sediment by light microscopy            NONE      
       NRG        

 

 Casts detection in urine sediment by light microscopy            NONE         
    NRG        

 

 Mucus detection in urine sediment by light microscopy            NEGATIVE     
        NRG        

 

 Complete urinalysis with reflex to culture            NO             NR      
  









 Comprehensive metabolic panel - 16 05:20         









 Serum or plasma sodium measurement (moles/volume)            134 mmol/L       
     135-145        

 

 Serum or plasma potassium measurement (moles/volume)            4.3 mmol/L    
        3.6-5.0        

 

 Serum or plasma chloride measurement (moles/volume)            104 mmol/L     
               

 

 Carbon dioxide            19 mmol/L            21-32        

 

 Serum or plasma anion gap determination (moles/volume)            11 mmol/L   
         5-14        

 

 Serum or plasma urea nitrogen measurement (mass/volume)            14 mg/dL   
         7-18        

 

 Serum or plasma creatinine measurement (mass/volume)            0.62 mg/dL    
        0.60-1.30        

 

 Serum or plasma urea nitrogen/creatinine mass ratio            23             
NRG        

 

 Serum or plasma creatinine measurement with calculation of estimated 
glomerular filtration rate            >             NRG        

 

 Serum or plasma glucose measurement (mass/volume)            140 mg/dL        
            

 

 Serum or plasma calcium measurement (mass/volume)            8.6 mg/dL        
    8.5-10.1        

 

 Serum or plasma total bilirubin measurement (mass/volume)            0.4 mg/dL
            0.1-1.0        

 

 Serum or plasma alkaline phosphatase measurement (enzymatic activity/volume)  
          161 U/L                    

 

 Serum or plasma aspartate aminotransferase measurement (enzymatic activity/
volume)            54 U/L            5-34        

 

 Serum or plasma alanine aminotransferase measurement (enzymatic activity/volume
)            37 U/L            0-55        

 

 Serum or plasma protein measurement (mass/volume)            5.5 g/dL         
   6.4-8.2        

 

 Serum or plasma albumin measurement (mass/volume)            2.8 g/dL         
   3.2-4.5        









 Serum or plasma phosphate measurement (mass/volume) - 16 05:20         









 Serum or plasma phosphate measurement (mass/volume)            3.9 mg/dL      
      2.3-4.7        









 Magnesium - 16 05:20         









 Magnesium            1.8 mg/dL            1.8-2.4        









 Automated blood complete blood count (hemogram) panel - 16 05:20         









 Blood leukocytes automated count (number/volume)            13.8 10*3/uL      
      4.3-11.0        

 

 Blood erythrocytes automated count (number/volume)            2.93 10*6/uL    
        4.35-5.85        

 

 Venous blood hemoglobin measurement (mass/volume)            7.9 g/dL         
   11.5-16.0        

 

 Blood hematocrit (volume fraction)            24 %            35-52        

 

 Automated erythrocyte mean corpuscular volume            81 [foz_us]          
  80-99        

 

 Automated erythrocyte mean corpuscular hemoglobin (mass per erythrocyte)      
      27 pg            25-34        

 

 Automated erythrocyte mean corpuscular hemoglobin concentration measurement (
mass/volume)            33 g/dL            32-36        

 

 Automated erythrocyte distribution width ratio            14.8 %            
10.0-14.5        

 

 Automated blood platelet count (count/volume)            524 10*3/uL          
  130-400        

 

 Automated blood platelet mean volume measurement            9.1 [foz_us]      
      7.4-10.4        









 Automated blood complete blood count (hemogram) panel - 16 04:45         









 Blood leukocytes automated count (number/volume)            14.7 10*3/uL      
      4.3-11.0        

 

 Blood erythrocytes automated count (number/volume)            3.10 10*6/uL    
        4.35-5.85        

 

 Venous blood hemoglobin measurement (mass/volume)            8.3 g/dL         
   11.5-16.0        

 

 Blood hematocrit (volume fraction)            25 %            35-52        

 

 Automated erythrocyte mean corpuscular volume            81 [foz_us]          
  80-99        

 

 Automated erythrocyte mean corpuscular hemoglobin (mass per erythrocyte)      
      27 pg            25-34        

 

 Automated erythrocyte mean corpuscular hemoglobin concentration measurement (
mass/volume)            33 g/dL            32-36        

 

 Automated erythrocyte distribution width ratio            14.9 %            
10.0-14.5        

 

 Automated blood platelet count (count/volume)            544 10*3/uL          
  130-400        

 

 Automated blood platelet mean volume measurement            8.9 [foz_us]      
      7.4-10.4        









 Whole blood basic metabolic panel - 16 04:45         









 Serum or plasma sodium measurement (moles/volume)            134 mmol/L       
     135-145        

 

 Serum or plasma potassium measurement (moles/volume)            4.5 mmol/L    
        3.6-5.0        

 

 Serum or plasma chloride measurement (moles/volume)            104 mmol/L     
               

 

 Carbon dioxide            20 mmol/L            21-32        

 

 Serum or plasma anion gap determination (moles/volume)            10 mmol/L   
         5-14        

 

 Serum or plasma urea nitrogen measurement (mass/volume)            15 mg/dL   
         7-18        

 

 Serum or plasma creatinine measurement (mass/volume)            0.65 mg/dL    
        0.60-1.30        

 

 Serum or plasma urea nitrogen/creatinine mass ratio            23             
NRG        

 

 Serum or plasma creatinine measurement with calculation of estimated 
glomerular filtration rate            >             NRG        

 

 Serum or plasma glucose measurement (mass/volume)            122 mg/dL        
            

 

 Serum or plasma calcium measurement (mass/volume)            8.9 mg/dL        
    8.5-10.1        









 Capillary blood glucose measurement by glucometer (mass/volume) - 08/15/16 05:
08         









 Capillary blood glucose measurement by glucometer (mass/volume)            120 
mg/dL                    









 Automated blood complete blood count (hemogram) panel - 08/15/16 05:45         









 Blood leukocytes automated count (number/volume)            13.8 10*3/uL      
      4.3-11.0        

 

 Blood erythrocytes automated count (number/volume)            2.93 10*6/uL    
        4.35-5.85        

 

 Venous blood hemoglobin measurement (mass/volume)            7.8 g/dL         
   11.5-16.0        

 

 Blood hematocrit (volume fraction)            24 %            35-52        

 

 Automated erythrocyte mean corpuscular volume            81 [foz_us]          
  80-99        

 

 Automated erythrocyte mean corpuscular hemoglobin (mass per erythrocyte)      
      27 pg            25-34        

 

 Automated erythrocyte mean corpuscular hemoglobin concentration measurement (
mass/volume)            33 g/dL            32-36        

 

 Automated erythrocyte distribution width ratio            14.9 %            
10.0-14.5        

 

 Automated blood platelet count (count/volume)            501 10*3/uL          
  130-400        

 

 Automated blood platelet mean volume measurement            9.2 [foz_us]      
      7.4-10.4        









 Comprehensive metabolic panel - 08/15/16 05:45         









 Serum or plasma sodium measurement (moles/volume)            135 mmol/L       
     135-145        

 

 Serum or plasma potassium measurement (moles/volume)            4.3 mmol/L    
        3.6-5.0        

 

 Serum or plasma chloride measurement (moles/volume)            106 mmol/L     
               

 

 Carbon dioxide            19 mmol/L            21-32        

 

 Serum or plasma anion gap determination (moles/volume)            10 mmol/L   
         5-14        

 

 Serum or plasma urea nitrogen measurement (mass/volume)            17 mg/dL   
         7-18        

 

 Serum or plasma creatinine measurement (mass/volume)            0.62 mg/dL    
        0.60-1.30        

 

 Serum or plasma urea nitrogen/creatinine mass ratio            27             
NRG        

 

 Serum or plasma creatinine measurement with calculation of estimated 
glomerular filtration rate            >             NRG        

 

 Serum or plasma glucose measurement (mass/volume)            108 mg/dL        
            

 

 Serum or plasma calcium measurement (mass/volume)            8.6 mg/dL        
    8.5-10.1        

 

 Serum or plasma total bilirubin measurement (mass/volume)            0.3 mg/dL
            0.1-1.0        

 

 Serum or plasma alkaline phosphatase measurement (enzymatic activity/volume)  
          173 U/L                    

 

 Serum or plasma aspartate aminotransferase measurement (enzymatic activity/
volume)            25 U/L            5-34        

 

 Serum or plasma alanine aminotransferase measurement (enzymatic activity/volume
)            41 U/L            0-55        

 

 Serum or plasma protein measurement (mass/volume)            5.7 g/dL         
   6.4-8.2        

 

 Serum or plasma albumin measurement (mass/volume)            3.0 g/dL         
   3.2-4.5        









 Serum or plasma triglyceride measurement (mass/volume) - 08/15/16 10:25        
 









 Serum or plasma triglyceride measurement (mass/volume)            120 mg/dL   
         <150        









 Serum or plasma lithium measurement (moles/volume) - 08/15/16 10:25         









 Serum or plasma prealbumin measurement (mass/volume)            19.2 mg/dL    
        18.0-35.7        









 Capillary blood glucose measurement by glucometer (mass/volume) - 16 05:
35         









 Capillary blood glucose measurement by glucometer (mass/volume)            104 
mg/dL                    









 Complete blood count (CBC) with automated white blood cell (WBC) differential 
- 17 16:05         









 Blood leukocytes automated count (number/volume)            7.7 10*3/uL       
     4.3-11.0        

 

 Blood erythrocytes automated count (number/volume)            4.83 10*6/uL    
        4.35-5.85        

 

 Venous blood hemoglobin measurement (mass/volume)            12.6 g/dL        
    11.5-16.0        

 

 Blood hematocrit (volume fraction)            39 %            35-52        

 

 Automated erythrocyte mean corpuscular volume            80 [foz_us]          
  80-99        

 

 Automated erythrocyte mean corpuscular hemoglobin (mass per erythrocyte)      
      26 pg            25-34        

 

 Automated erythrocyte mean corpuscular hemoglobin concentration measurement (
mass/volume)            33 g/dL            32-36        

 

 Automated erythrocyte distribution width ratio            17.4 %            
10.0-14.5        

 

 Automated blood platelet count (count/volume)            458 10*3/uL          
  130-400        

 

 Automated blood platelet mean volume measurement            9.5 [foz_us]      
      7.4-10.4        

 

 Automated blood neutrophils/100 leukocytes            73 %            42-75   
     

 

 Automated blood lymphocytes/100 leukocytes            10 %            12-44   
     

 

 Blood monocytes/100 leukocytes            9 %            0-12        

 

 Automated blood eosinophils/100 leukocytes            8 %            0-10     
   

 

 Automated blood basophils/100 leukocytes            0 %            0-10        

 

 Blood neutrophils automated count (number/volume)            5.7 10*3         
   1.8-7.8        

 

 Blood lymphocytes automated count (number/volume)            0.8 10*3         
   1.0-4.0        

 

 Blood monocytes automated count (number/volume)            0.7 10*3            
0.0-1.0        

 

 Automated eosinophil count            0.6 10*3/uL            0.0-0.3        

 

 Automated blood basophil count (count/volume)            0.0 10*3/uL          
  0.0-0.1        









 Comprehensive metabolic panel - 17 16:05         









 Serum or plasma sodium measurement (moles/volume)            137 mmol/L       
     135-145        

 

 Serum or plasma potassium measurement (moles/volume)            2.9 mmol/L    
        3.6-5.0        

 

 Serum or plasma chloride measurement (moles/volume)            101 mmol/L     
               

 

 Carbon dioxide            22 mmol/L            21-32        

 

 Serum or plasma anion gap determination (moles/volume)            14 mmol/L   
         5-14        

 

 Serum or plasma urea nitrogen measurement (mass/volume)            15 mg/dL   
         7-18        

 

 Serum or plasma creatinine measurement (mass/volume)            1.03 mg/dL    
        0.60-1.30        

 

 Serum or plasma urea nitrogen/creatinine mass ratio            15             
NRG        

 

 Serum or plasma creatinine measurement with calculation of estimated 
glomerular filtration rate            55             NRG        

 

 Serum or plasma glucose measurement (mass/volume)            107 mg/dL        
            

 

 Serum or plasma calcium measurement (mass/volume)            9.3 mg/dL        
    8.5-10.1        

 

 Serum or plasma total bilirubin measurement (mass/volume)            0.6 mg/dL
            0.1-1.0        

 

 Serum or plasma alkaline phosphatase measurement (enzymatic activity/volume)  
          119 U/L                    

 

 Serum or plasma aspartate aminotransferase measurement (enzymatic activity/
volume)            8 U/L            5-34        

 

 Serum or plasma alanine aminotransferase measurement (enzymatic activity/volume
)            9 U/L            0-55        

 

 Serum or plasma protein measurement (mass/volume)            7.3 g/dL         
   6.4-8.2        

 

 Serum or plasma albumin measurement (mass/volume)            4.5 g/dL         
   3.2-4.5        









 Lipase - 17 16:05         









 Lipase            6 U/L            8-78        









 Complete urinalysis with reflex to culture - 17 16:17         









 Urine color determination            YELLOW             NRG        

 

 Urine clarity determination            VERY CLOUDY             NRG        

 

 Urine pH measurement by test strip            6             5-9        

 

 Specific gravity of urine by test strip            1.010             1.016-
1.022        

 

 Urine protein assay by test strip, semi-quantitative            2+             
NEGATIVE        

 

 Urine glucose detection by automated test strip            NEGATIVE           
  NEGATIVE        

 

 Erythrocytes detection in urine sediment by light microscopy            1+    
         NEGATIVE        

 

 Urine ketones detection by automated test strip            NEGATIVE           
  NEGATIVE        

 

 Urine nitrite detection by test strip            NEGATIVE             NEGATIVE
        

 

 Urine total bilirubin detection by test strip            1+             
NEGATIVE        

 

 Urine urobilinogen measurement by automated test strip (mass/volume)          
  NORMAL             NORMAL        

 

 Urine leukocyte esterase detection by dipstick            2+             
NEGATIVE        

 

 Automated urine sediment erythrocyte count by microscopy (number/high power 
field)            NONE             NRG        

 

 Automated urine sediment leukocyte count by microscopy (number/high power field
)             [HPF]            NRG        

 

 Bacteria detection in urine sediment by light microscopy            TRACE     
        NRG        

 

 Squamous epithelial cells detection in urine sediment by light microscopy     
       5-10             NRG        

 

 Crystals detection in urine sediment by light microscopy            NONE      
       NRG        

 

 Casts detection in urine sediment by light microscopy            NONE         
    NRG        

 

 Mucus detection in urine sediment by light microscopy            LARGE        
     NRG        

 

 Complete urinalysis with reflex to culture            YES             NRG     
   









 Bacterial urine culture - 17 16:17         









 URINE CULTURE RESULTS            <10,000/ML             NRG        









 Complete blood count (CBC) with automated white blood cell (WBC) differential 
- 17 14:35         









 Blood leukocytes automated count (number/volume)            8.2 10*3/uL       
     4.3-11.0        

 

 Blood erythrocytes automated count (number/volume)            4.35 10*6/uL    
        4.35-5.85        

 

 Venous blood hemoglobin measurement (mass/volume)            12.2 g/dL        
    11.5-16.0        

 

 Blood hematocrit (volume fraction)            37 %            35-52        

 

 Automated erythrocyte mean corpuscular volume            86 [foz_us]          
  80-99        

 

 Automated erythrocyte mean corpuscular hemoglobin (mass per erythrocyte)      
      28 pg            25-34        

 

 Automated erythrocyte mean corpuscular hemoglobin concentration measurement (
mass/volume)            33 g/dL            32-36        

 

 Automated erythrocyte distribution width ratio            15.3 %            
10.0-14.5        

 

 Automated blood platelet count (count/volume)            423 10*3/uL          
  130-400        

 

 Automated blood platelet mean volume measurement            9.3 [foz_us]      
      7.4-10.4        

 

 Automated blood neutrophils/100 leukocytes            63 %            42-75   
     

 

 Automated blood lymphocytes/100 leukocytes            23 %            12-44   
     

 

 Blood monocytes/100 leukocytes            7 %            0-12        

 

 Automated blood eosinophils/100 leukocytes            5 %            0-10     
   

 

 Automated blood basophils/100 leukocytes            2 %            0-10        

 

 Blood neutrophils automated count (number/volume)            5.2 10*3         
   1.8-7.8        

 

 Blood lymphocytes automated count (number/volume)            1.9 10*3         
   1.0-4.0        

 

 Blood monocytes automated count (number/volume)            0.6 10*3            
0.0-1.0        

 

 Automated eosinophil count            0.4 10*3/uL            0.0-0.3        

 

 Automated blood basophil count (count/volume)            0.2 10*3/uL          
  0.0-0.1        









 Methicillin resistant Staphylococcus aureus (MRSA) screening culture -  14:35         









 Methicillin resistant Staphylococcus aureus (MRSA) screening culture          
  NEG             NRG        









 THYROID ANALYZER - 18 11:13         









 TSH            2.24 mIU/L            0.40-4.50        









 Complete urinalysis with reflex to culture - 18 02:01         









 Urine color determination            YELLOW             NRG        

 

 Urine clarity determination            SLIGHTLY CLOUDY             NRG        

 

 Urine pH measurement by test strip            6             5-9        

 

 Specific gravity of urine by test strip            1.010             1.016-
1.022        

 

 Urine protein assay by test strip, semi-quantitative            NEGATIVE      
       NEGATIVE        

 

 Urine glucose detection by automated test strip            NEGATIVE           
  NEGATIVE        

 

 Erythrocytes detection in urine sediment by light microscopy            
NEGATIVE             NEGATIVE        

 

 Urine ketones detection by automated test strip            NEGATIVE           
  NEGATIVE        

 

 Urine nitrite detection by test strip            NEGATIVE             NEGATIVE
        

 

 Urine total bilirubin detection by test strip            NEGATIVE             
NEGATIVE        

 

 Urine urobilinogen measurement by automated test strip (mass/volume)          
  NORMAL             NORMAL        

 

 Urine leukocyte esterase detection by dipstick            3+             
NEGATIVE        

 

 Automated urine sediment erythrocyte count by microscopy (number/high power 
field)            NONE             NRG        

 

 Automated urine sediment leukocyte count by microscopy (number/high power field
)             [HPF]            NRG        

 

 Bacteria detection in urine sediment by light microscopy            FEW       
      NRG        

 

 Squamous epithelial cells detection in urine sediment by light microscopy     
       5-10             NRG        

 

 Crystals detection in urine sediment by light microscopy            NONE      
       NRG        

 

 Casts detection in urine sediment by light microscopy            PRESENT      
       NRG        

 

 Mucus detection in urine sediment by light microscopy            NEGATIVE     
        NRG        

 

 Complete urinalysis with reflex to culture            YES             NRG     
   

 

 Hyaline casts detection in urine sediment by light microscopy            0-2  
           NRG        









 Bacterial urine culture - 18 02:01         









 URINE CULTURE RESULTS            <10,000/ML             NRG        









 Complete blood count (CBC) with automated white blood cell (WBC) differential 
- 18 02:12         









 Blood leukocytes automated count (number/volume)            12.4 10*3/uL      
      4.3-11.0        

 

 Blood erythrocytes automated count (number/volume)            4.08 10*6/uL    
        4.35-5.85        

 

 Venous blood hemoglobin measurement (mass/volume)            12.0 g/dL        
    11.5-16.0        

 

 Blood hematocrit (volume fraction)            35 %            35-52        

 

 Automated erythrocyte mean corpuscular volume            86 [foz_us]          
  80-99        

 

 Automated erythrocyte mean corpuscular hemoglobin (mass per erythrocyte)      
      29 pg            25-34        

 

 Automated erythrocyte mean corpuscular hemoglobin concentration measurement (
mass/volume)            34 g/dL            32-36        

 

 Automated erythrocyte distribution width ratio            14.5 %            
10.0-14.5        

 

 Automated blood platelet count (count/volume)            335 10*3/uL          
  130-400        

 

 Automated blood platelet mean volume measurement            10.0 [foz_us]     
       7.4-10.4        

 

 Automated blood neutrophils/100 leukocytes            58 %            42-75   
     

 

 Automated blood lymphocytes/100 leukocytes            24 %            12-44   
     

 

 Blood monocytes/100 leukocytes            9 %            0-12        

 

 Automated blood eosinophils/100 leukocytes            8 %            0-10     
   

 

 Automated blood basophils/100 leukocytes            1 %            0-10        

 

 Blood neutrophils automated count (number/volume)            7.2 10*3         
   1.8-7.8        

 

 Blood lymphocytes automated count (number/volume)            3.0 10*3         
   1.0-4.0        

 

 Blood monocytes automated count (number/volume)            1.1 10*3            
0.0-1.0        

 

 Automated eosinophil count            1.0 10*3/uL            0.0-0.3        

 

 Automated blood basophil count (count/volume)            0.1 10*3/uL          
  0.0-0.1        









 Comprehensive metabolic panel - 18 02:12         









 Serum or plasma sodium measurement (moles/volume)            131 mmol/L       
     135-145        

 

 Serum or plasma potassium measurement (moles/volume)            4.1 mmol/L    
        3.6-5.0        

 

 Serum or plasma chloride measurement (moles/volume)            97 mmol/L      
              

 

 Carbon dioxide            21 mmol/L            21-32        

 

 Serum or plasma anion gap determination (moles/volume)            13 mmol/L   
         5-14        

 

 Serum or plasma urea nitrogen measurement (mass/volume)            26 mg/dL   
         7-18        

 

 Serum or plasma creatinine measurement (mass/volume)            1.43 mg/dL    
        0.60-1.30        

 

 Serum or plasma urea nitrogen/creatinine mass ratio            18             
NRG        

 

 Serum or plasma creatinine measurement with calculation of estimated 
glomerular filtration rate            37             NRG        

 

 Serum or plasma glucose measurement (mass/volume)            93 mg/dL         
           

 

 Serum or plasma calcium measurement (mass/volume)            9.1 mg/dL        
    8.5-10.1        

 

 Serum or plasma total bilirubin measurement (mass/volume)            0.4 mg/dL
            0.1-1.0        

 

 Serum or plasma alkaline phosphatase measurement (enzymatic activity/volume)  
          79 U/L                    

 

 Serum or plasma aspartate aminotransferase measurement (enzymatic activity/
volume)            22 U/L            5-34        

 

 Serum or plasma alanine aminotransferase measurement (enzymatic activity/volume
)            17 U/L            0-55        

 

 Serum or plasma protein measurement (mass/volume)            7.2 g/dL         
   6.4-8.2        

 

 Serum or plasma albumin measurement (mass/volume)            4.3 g/dL         
   3.2-4.5        









 Magnesium - 18 02:12         









 Magnesium            2.5 mg/dL            1.8-2.4        









 Blood lactic acid measurement (moles/volume) - 18 03:09         









 Blood lactic acid measurement (moles/volume)            1.77 mmol/L            
0.50-2.00        









 CMP - 18 13:38         









 GLUCOSE            91 mg/dL            65-99        

 

 UREA NITROGEN (BUN)            17 mg/dL            7-25        

 

 CREATININE            1.02 mg/dL            0.50-0.99        

 

 eGFR NON-AFR. AMERICAN            59 mL/min/1.73m2            > OR=60        

 

 eGFR             69 mL/min/1.73m2            > OR=60        

 

 BUN/CREATININE RATIO            17 (calc)            6-22        

 

 SODIUM            138 mmol/L            135-146        

 

 POTASSIUM            3.8 mmol/L            3.5-5.3        

 

 CHLORIDE            99 mmol/L                    

 

 CARBON DIOXIDE            30 mmol/L            20-31        

 

 CALCIUM            9.9 mg/dL            8.6-10.4        

 

 PROTEIN, TOTAL            7.2 g/dL            6.1-8.1        

 

 ALBUMIN            4.8 g/dL            3.6-5.1        

 

 GLOBULIN            2.4 g/dL (calc)            1.9-3.7        

 

 ALBUMIN/GLOBULIN RATIO            2.0 (calc)            1.0-2.5        

 

 BILIRUBIN, TOTAL            0.5 mg/dL            0.2-1.2        

 

 ALKALINE PHOSPHATASE            94 U/L                    

 

 AST            16 U/L            10-35        

 

 ALT            13 U/L            6-29        



                                                                               
                                                                               
        



Encounters

      





 ACCT No.            Visit Date/Time            Discharge            Status    
        Pt. Type            Provider            Facility            Loc./Unit  
          Complaint        

 

 [id number]            2018 01:50:00            2018 04:33:00    
        DIS            Emergency            BOB ORTIZ, CHESTER THOMSON            [facility] 
[facility]            ER            RECTAL BLEEDING ,STS HAS 
HERNIA-PAINFUL        

 

 [id number]            2017 07:40:00            2017 11:20:00    
        DIS            Outpatient            ABISAI MOORE MD            [facility]            VENTRAL HERNIA        

 

 [id number]            2017 14:06:00            2017 15:14:00    
        DIS            Outpatient            ABISAI MOORE MD            [facility] 
[facility]            PREOP            VENTRAL HERNIA        

 

 [id number]            2017 15:32:00            2017 17:42:00    
        DIS            Emergency            JEAN PAUL JARRELL APRN            [facility]ia 
[facility]            ER            VOMITING/DIARRHEA        

 

 [id number]            2017 12:04:00            2017 23:59:59    
        CLS            Outpatient            DEGRAFFEBENOIT TEMPLETON      
      [facility]            RAD            PERIUMBILICAL 
ABD PAIN        

 

 [id number]            2017 14:53:00            2017 23:59:59    
        CLS            Outpatient            DEGRAFFEBENOIT TEMPLETON      
      [facility]            RAD            PERIUMBILICAL 
ABDOMINAL PAIN        

 

 [id number]            2016 23:40:00            2016 16:30:00    
        DIS            Inpatient            ABISAI MOORE MD            [facility]ia 
[facility]            4TH            PERFORATED GASTRIC ULCER
        

 

 [id number]            2015 11:42:00            2015 13:19:00    
        DIS            Emergency            JEAN PAUL JARRELL APRN            [facility]ia 
[facility]            ER            VOMITING/HEADACHE        

 

 [id number]            2015 12:04:00            2015 23:59:59    
        CLS            Outpatient            SCHOEEBENEZER NASCIMENTO MD            
[facility]   
     

 

 [id number]            2015 22:11:00            2015 09:31:00    
        DIS            Inpatient            EBENEZER ROSAS MD            [facility]ia 
[facility]            SURGICAL            SYNCOPAL EPISODE,
DEHYDRATION,HEAD CONTUSION        

 

 [id number]            10/07/2014 09:09:00            10/07/2014 23:59:59    
        CLS            Outpatient            RELL CHRISTIANSON MD            [facility]ia 
[facility]            PREOP            STENOSIS        

 

 [id number]            2014 09:44:00            2014 23:59:59    
        CLS            Outpatient            REJI HARO            
[facility]            RAD            LUMBAR RADICULOPATHY
        

 

 [id number]            2014 12:08:00            2014 15:23:00    
        DIS            Emergency            REJI HARO            
[facility]            ER            LOW BACK PAIN, L LEG, 
L FOOT PAIN        

 

 [id number]            2013 04:59:00            2013 13:10:00    
        DIS            Inpatient            EBENEZER ROSAS MD            [facility]ia 
[facility]            4TH            ABDOMINAL PAIN, 
HYPONATREMIA, UTI        

 

 [id number]            2013 10:55:00            2013 23:59:59    
        CLS            Outpatient            EBENEZER ROSAS MD            
[facility]            RAD            ABD PAIN        

 

 [id number]            2014 15:18:00                                   
   Document Registration                                                       
     

 

 [id number]            10/02/2012 11:09:00                                   
   Document Registration                                                       
     

 

 [id number]            2012 08:20:00                                   
   Document Registration                                                       
     

 

 [id number]            2012 11:04:00                                   
   Document Registration                                                       
     

 

 [id number]            2012 12:29:00                                   
   Document Registration                                                       
     

 

 [id number]            2012 20:45:00                                   
   Document Registration                                                       
     

 

 [id number]            2012 19:28:00                                   
   Document Registration                                                       
     

 

 [id number]            2018 10:40:00            2018 23:59:59            
CLS            Outpatient            [facility]ACKTESBENOIT                
         [facility]                     

 

 [id number]            2018 13:20:00                                      
Document Registration                                                          
  

 

 [id number]            2018 10:20:00                                      
Document Registration

## 2018-05-14 NOTE — XMS REPORT
[facility]

 Created on: 10/08/2017



Rubin Judie

External Reference #: [id number]

: 1955

Sex: Female



Demographics







 Address  [address] [place]  [address]

 

 Preferred Language  Unknown

 

 Marital Status  Unknown

 

 Jewish Affiliation  Unknown

 

 Race  Unknown

 

 Ethnic Group  Unknown





Author







 Author  BENOIT LINDO

 

 [facility]

 

 Address  [address] [place]  [address]



 

 Phone  [phone]







Care Team Providers







 Care Team Member Name  Role  Phone

 

 BENOIT LINDO  Unavailable  [phone]







PROBLEMS







 Type  Condition  ICD9-CM Code  ZZD90-SY Code  Onset Dates  Condition Status  
SNOMED Code

 

 Problem  Low back pain     M54.5     Active  223473932

 

 Problem  Other chronic pain     G89.29     Active  84480013

 

 Problem  Chronic or unspecified gastric ulcer with both hemorrhage and 
perforation     K25.6     Active  84634421

 

 Problem  Elevated platelet count     D47.3     Active  1233867

 

 Problem  Eosinophil count raised     D72.1     Active  067609625

 

 Problem  Elevated cholesterol     E78.00     Active  25410515

 

 Problem  Hypokalemia     E87.6     Active  63669031

 

 Problem  Hx of rheumatoid arthritis     Z87.39     Active  896319283

 

 Problem  Rheumatoid arthritis of multiple sites with negative rheumatoid 
factor     M06.09     Active  946403509

 

 Problem  Acquired hypothyroidism     E03.9     Active  764848228

 

 Problem  High risk medication use     Z79.899     Active  985194985

 

 Problem  Anxiety     F41.9     Active  11747139

 

 Problem  Elevated cholesterol     E78.0     Active  35554001

 

 Problem  Overactive bladder     N32.81     Active  120568201







ALLERGIES







 Substance  Reaction  Event Type  Date  Status

 

 Valium  hives  Drug Allergy  15 Mar, 2017  Active

 

 Tapazole  local swelling  Drug Allergy  15 Mar, 2017  Active

 

 PredniSONE  seizure  Drug Allergy  15 Mar, 2017  Active

 

 Ketorolac Tromethamine  hives  Drug Allergy  15 Mar, 2017  Active

 

 Darvon  hives  Drug Allergy  15 Mar, 2017  Active

 

 Baclofen  memory loss  Drug Allergy  15 Mar, 2017  Active







SOCIAL HISTORY

Never Assessed



PLAN OF CARE







 Activity  Details









  









 Follow Up  3 Months Reason:CHM Arthritis







VITAL SIGNS







 Height  65 in  2017-03-15

 

 Weight  152.6 lbs  2017-03-15

 

 Temperature  95.8 degrees Fahrenheit  2017-03-15

 

 Heart Rate  100 bpm  2017-03-15

 

 Respiratory Rate  16   2017-03-15

 

 BMI  25.39 kg/m2  2017-03-15

 

 Blood pressure systolic  102 mmHg  2017-03-15

 

 Blood pressure diastolic  78 mmHg  2017-03-15







MEDICATIONS







 Medication  Instructions  Dosage  Frequency  Start Date  End Date  Duration  
Status

 

 Hydrochlorothiazide 25 MG  Orally Once a day  1 tablet  24h           Active

 

 Potassium Chloride ER 10 MEQ  Orally Once a day  2 tablets  24h  30 2015 
       Active

 

 Detrol 2 MG  Orally Once a day  2 tablets  24h        30  Active

 

 Zofran 8 MG  Orally every 8 hours, PRN  1 tablet          0 days  
Active

 

 Skelaxin 800 MG  Orally Once a day  1 tablet  24h  04 Dec, 2015        Active

 

 Meloxicam 15 MG  Orally Once a day  1 tablet  24h           Active

 

 BuPROPion HCl (SR) 150 MG  Orally Once a day  1 tablet  24h           Active

 

 Lexapro 20 mg  Orally Once a day  1 tablet  24h           Active

 

 Hydrocodone-Acetaminophen 7.5-325 MG  Orally 2 times a day  1 tablet as needed
  12h           Active

 

 Levothyroxine Sodium 25 MCG  Orally Once a day. REPEAT LAB DUE 16  1 
tablet     08 Dec, 2015        Active

 

 Pantoprazole Sodium 40 mg  Orally Once a day  1 tablet  24h           Active







RESULTS

No Results



PROCEDURES

No Known procedures



IMMUNIZATIONS

No Known Immunizations



MEDICAL (GENERAL) HISTORY







 Type  Description  Date

 

 Medical History  rheumatoid arthritis   

 

 Medical History  osteoarthritis   

 

 Medical History  depression   

 

 Medical History  uterine prolapse   

 

 Medical History  Perforated Gastric Ulcer-Dr. Del Valle   

 

 Surgical History  thyroidectomy, complete s/p goiter  

 

 Surgical History  left salivary gland removed due to stones/sludge   

 

 Surgical History  right carpal tunnel release   

 

 Surgical History  right ulnar nerve release with piece of elbow removed   

 

 Surgical History  right lower lobectomy: lung s/p benign cyst removal  

 

 Surgical History  partial hysterectomy s/p endometriosis  

 

 Surgical History  appendectomy  

 

 Surgical History  cholecystectomy  

 

 Surgical History  left knee arthroscopy x 3, reconstructed x 1   

 

 Surgical History  right knee arthroscopy x 3, reconstructed x 1   

 

 Surgical History  lumbar surgery  2014

 

 Surgical History  Perforated Gastric Ulcer  2016

 

 Surgical History  Hernia repair by   17

 

 Hospitalization History  childbirth, surgeries   

 

 Hospitalization History  syncope with collapse, dx with hyponatremia, 
hypopotassemia  2015

 

 Hospitalization History  Perforated Gastric Ulcer-Pt voices in hp 21 days  2016

## 2018-05-14 NOTE — XMS REPORT
[facility]

 Created on: 09/10/2017



Judie Conklin

External Reference #: [id number]

: 1955

Sex: Female



Demographics







 Address  [address] [place]  [address]

 

 Preferred Language  Unknown

 

 Marital Status  Unknown

 

 [place] Affiliation  Unknown

 

 Race  Unknown

 

 Ethnic Group  Unknown





Author







 Author  BENOIT LINDO

 

 Organization  [facility]

 

 Address  [address] [place]  [address]



 

 Phone  [phone]







Care Team Providers







 Care Team Member Name  Role  Phone

 

 BENOIT LINDO  Unavailable  [phone]







PROBLEMS







 Type  Condition  ICD9-CM Code  IZS02-NK Code  Onset Dates  Condition Status  
SNOMED Code

 

 Problem  Low back pain     M54.5     Active  476048267

 

 Problem  Other chronic pain     G89.29     Active  62110881

 

 Problem  Chronic or unspecified gastric ulcer with both hemorrhage and 
perforation     K25.6     Active  20760278

 

 Problem  Elevated platelet count     D47.3     Active  6314701

 

 Problem  Eosinophil count raised     D72.1     Active  152113742

 

 Problem  Elevated cholesterol     E78.00     Active  60904028

 

 Problem  Hypokalemia     E87.6     Active  62628002

 

 Problem  Hx of rheumatoid arthritis     Z87.39     Active  206962462

 

 Problem  Rheumatoid arthritis of multiple sites with negative rheumatoid 
factor     M06.09     Active  819363425

 

 Problem  Acquired hypothyroidism     E03.9     Active  862642071

 

 Problem  High risk medication use     Z79.899     Active  088447191

 

 Problem  Anxiety     F41.9     Active  57429809

 

 Problem  Elevated cholesterol     E78.0     Active  65069676

 

 Problem  Overactive bladder     N32.81     Active  888374747







ALLERGIES

Unknown Allergies



SOCIAL HISTORY

No smoking Hx information available



PLAN OF CARE





VITAL SIGNS





MEDICATIONS

Unknown Medications



RESULTS







 Name  Result  Date  Reference Range

 

 CT Scan : Abdomen & Pelvis w/o Contrast     2017   







PROCEDURES

No Known procedures



IMMUNIZATIONS

No Known Immunizations

## 2018-05-16 ENCOUNTER — HOSPITAL ENCOUNTER (OUTPATIENT)
Dept: HOSPITAL 75 - PREOP | Age: 63
End: 2018-05-16
Attending: SURGERY
Payer: COMMERCIAL

## 2018-05-16 VITALS — HEIGHT: 65 IN | BODY MASS INDEX: 28.99 KG/M2 | WEIGHT: 174 LBS

## 2018-05-16 DIAGNOSIS — Z01.818: Primary | ICD-10-CM

## 2018-05-16 DIAGNOSIS — K43.2: ICD-10-CM

## 2018-05-21 ENCOUNTER — HOSPITAL ENCOUNTER (OUTPATIENT)
Dept: HOSPITAL 75 - SDC | Age: 63
Discharge: HOME | End: 2018-05-21
Attending: SURGERY
Payer: COMMERCIAL

## 2018-05-21 VITALS — WEIGHT: 174 LBS | BODY MASS INDEX: 28.99 KG/M2 | HEIGHT: 65 IN

## 2018-05-21 VITALS — DIASTOLIC BLOOD PRESSURE: 67 MMHG | SYSTOLIC BLOOD PRESSURE: 100 MMHG

## 2018-05-21 DIAGNOSIS — Z53.9: ICD-10-CM

## 2018-05-21 DIAGNOSIS — K43.2: Primary | ICD-10-CM

## 2018-05-21 DIAGNOSIS — Z11.2: ICD-10-CM

## 2018-05-21 PROCEDURE — 87081 CULTURE SCREEN ONLY: CPT

## 2018-05-22 ENCOUNTER — HOSPITAL ENCOUNTER (OUTPATIENT)
Dept: HOSPITAL 75 - SDC | Age: 63
Setting detail: OBSERVATION
LOS: 2 days | Discharge: HOME | End: 2018-05-24
Attending: SURGERY | Admitting: SURGERY
Payer: COMMERCIAL

## 2018-05-22 VITALS — SYSTOLIC BLOOD PRESSURE: 144 MMHG | DIASTOLIC BLOOD PRESSURE: 68 MMHG

## 2018-05-22 VITALS — SYSTOLIC BLOOD PRESSURE: 123 MMHG | DIASTOLIC BLOOD PRESSURE: 59 MMHG

## 2018-05-22 VITALS — SYSTOLIC BLOOD PRESSURE: 129 MMHG | DIASTOLIC BLOOD PRESSURE: 62 MMHG

## 2018-05-22 VITALS — DIASTOLIC BLOOD PRESSURE: 57 MMHG | SYSTOLIC BLOOD PRESSURE: 93 MMHG

## 2018-05-22 VITALS — WEIGHT: 174 LBS | BODY MASS INDEX: 28.99 KG/M2 | HEIGHT: 65 IN

## 2018-05-22 DIAGNOSIS — I10: ICD-10-CM

## 2018-05-22 DIAGNOSIS — F17.210: ICD-10-CM

## 2018-05-22 DIAGNOSIS — K43.2: Primary | ICD-10-CM

## 2018-05-22 DIAGNOSIS — Z79.899: ICD-10-CM

## 2018-05-22 PROCEDURE — 94664 DEMO&/EVAL PT USE INHALER: CPT

## 2018-05-22 PROCEDURE — 99211 OFF/OP EST MAY X REQ PHY/QHP: CPT

## 2018-05-22 RX ADMIN — FENTANYL CITRATE PRN MCG: 50 INJECTION, SOLUTION INTRAMUSCULAR; INTRAVENOUS at 13:15

## 2018-05-22 RX ADMIN — FENTANYL CITRATE PRN MCG: 50 INJECTION, SOLUTION INTRAMUSCULAR; INTRAVENOUS at 13:00

## 2018-05-22 RX ADMIN — SODIUM CHLORIDE, SODIUM LACTATE, POTASSIUM CHLORIDE, AND CALCIUM CHLORIDE SCH MLS/HR: 600; 310; 30; 20 INJECTION, SOLUTION INTRAVENOUS at 14:41

## 2018-05-22 RX ADMIN — SODIUM CHLORIDE, SODIUM LACTATE, POTASSIUM CHLORIDE, AND CALCIUM CHLORIDE PRN MLS/HR: 600; 310; 30; 20 INJECTION, SOLUTION INTRAVENOUS at 10:20

## 2018-05-22 RX ADMIN — FENTANYL CITRATE PRN MCG: 50 INJECTION, SOLUTION INTRAMUSCULAR; INTRAVENOUS at 20:30

## 2018-05-22 RX ADMIN — FENTANYL CITRATE PRN MCG: 50 INJECTION, SOLUTION INTRAMUSCULAR; INTRAVENOUS at 23:35

## 2018-05-22 RX ADMIN — SODIUM CHLORIDE, SODIUM LACTATE, POTASSIUM CHLORIDE, AND CALCIUM CHLORIDE PRN MLS/HR: 600; 310; 30; 20 INJECTION, SOLUTION INTRAVENOUS at 08:00

## 2018-05-22 RX ADMIN — FENTANYL CITRATE PRN MCG: 50 INJECTION, SOLUTION INTRAMUSCULAR; INTRAVENOUS at 17:21

## 2018-05-22 RX ADMIN — FENTANYL CITRATE PRN MCG: 50 INJECTION, SOLUTION INTRAMUSCULAR; INTRAVENOUS at 13:10

## 2018-05-22 RX ADMIN — SODIUM CHLORIDE, SODIUM LACTATE, POTASSIUM CHLORIDE, AND CALCIUM CHLORIDE PRN MLS/HR: 600; 310; 30; 20 INJECTION, SOLUTION INTRAVENOUS at 13:28

## 2018-05-22 NOTE — PROGRESS NOTE-PRE OPERATIVE
Pre-Operative Progress Note


H&P Reviewed


The H&P was reviewed, patient examined and no changes noted.


Date Seen by Provider:  May 17, 2018


Time Seen by Provider:  11:00


Date H&P Reviewed:  May 22, 2018


Time H&P Reviewed:  08:42


Pre-Operative Diagnosis:  Recurrent ventral hernia











CONRAD QUACH MD May 22, 2018 8:42 am

## 2018-05-22 NOTE — OPERATIVE REPORT
Operative Report


Date of Procedure/Surgery


May 22, 2018


Surgeon (s)


CONRAD QUACH MD


Assistant (s):  N/A





Post-Operative Diagnosis





Same





Procedure Performed





Robotic assisted repair of ventral hernia with biologic mesh





Description of Procedure


Anesthesia Type:  General


Estimated blood loss (mL):  Minimal


Specimen(s) collected/removed


None


Description of the Procedure


Indication for the procedure: This lady presented with a symptomatic, recurrent 

ventral hernia superior and to the right of the umbilicus.  She had undergone 

open repair of a gastrocolic fistula followed by mesh to repair of the hernia.





She was offered repair using minimally invasive technique with robotic assist 

and reinforcement using a biologic mesh.  The latter was felt to be appropriate 

due to the contaminated nature of the previous operation.  Informed consent was 

obtained after reviewing the operative details and complications of iatrogenic 

bowel injury, intra-abdominal abscess and further recurrence of the hernia.





Description of the procedure: She was placed supine on the operating table and 

general anesthesia induced.  2 g of Ancef were administered intravenously as 

prophylaxis against wound infection.  Sequential compression devices were 

placed around her legs, to minimize the risk of venous thrombosis.





Abdomen was prepared and draped in the usual sterile manner.  Pneumoperitoneum 

was established using a Veress needle introduced over the left subcostal 

margin.  Intra-abdominal pressure was initially maintained at 15 mmHg, using 

carbon dioxide insufflation.  A 12 mm trocar was placed and anatomy visualized 

using the high definition, 3-dimensional laparoscope associated with da Jenna 

system.  Omentum and multiple loops of small bowel were adherent to the 

anterior abdominal wall.  The patient was then rolled into the right side using 

a soft support over the left side of the body, to facilitate triangulation of 

the robotic system.





Under direct view, I placed another 12 mm trocar over the left side of the 

abdomen, along the midaxillary line, followed by an 8 mm trocar over the left 

lower quadrant.  An additional 12 mm trocar was placed over the left subcostal 

region, to facilitate passing sutures.





The robotic system was then docked in place.  Small bowel and omentum were 

taken down by sharp dissection with scissors without any iatrogenic injury.  

The colon contained within the hernia was also taken down using the same 

technique.  The symptomatic defect measured at least 6 cm in diameter, in a 

transverse fashion.  In addition, there were smaller defects along the superior 

and inferior aspect of the scar.





The primary defect and the smaller defects were closed using 0V LOC suture (

nonabsorbable) using robotic assistance, without any tension.  A biologic mesh 

measuring 20 cm in length by 10 cm in width was chosen for reinforcement.  A 2-

0 Vicryl suture was laced along the central aspect of the mesh to facilitate 

anchoring of the mesh during the suturing process.  The mesh was introduced 

into the peritoneal cavity, unfurled and the edges were secured using 20, 

absorbable, the LOC sutures with the robotic assistance.  Hemostasis was 

satisfactory and the mesh appeared to lie without any laxity.





Incisions were then closed using 4-0 Vicryl, in a subcuticular fashion.  0.5 

percent Marcaine with epinephrine was infiltrated along the incisions, both pre-

emptively and at the conclusion of the operation.  Prior she tolerated the 

procedure well, was extubated in the operating room and taken to the recovery 

room in a stable condition.





King catheter placed at the beginning of the operation was removed before 

extubation.


Findings of the Procedure


See op report





Allergies and Home Medications


Allergies


Coded Allergies:  


     aspirin (Unverified  Allergy, Mild, 5/9/17)


     ketorolac (Unverified  Allergy, Mild, 5/9/17)


     pentazocine (Unverified  Allergy, Mild, 5/9/17)


     diazepam (Verified  Allergy, Unknown, 5/9/17)


     duloxetine (Unverified  Allergy, Unknown, HIVES AND BEHAVIOR EPISODES, 5/9/ 17)


     methimazole (Verified  Allergy, Unknown, 5/9/17)


     nalbuphine (Unverified  Allergy, Unknown, HIVES, 5/9/17)


     propoxyphene (Verified  Allergy, Unknown, 5/9/17)


     topiramate (Unverified  Allergy, Unknown, MOUTH SWELLING AND SORES, 5/9/17)


     tramadol HCl (Unverified  Allergy, Unknown, RASH, 5/9/17)


     morphine (Verified  Adverse Reaction, Intermediate, NAUSEA/VOMITTING/

SYNCOPE, 5/22/18)


 


 Pt has received Hydrocodone/APAP in the past w/o issue


     prednisone (Unverified  Adverse Reaction, Unknown, 5/9/17)


 


 seizure





Home Medications


Aspirin/Acetaminophen/Caffeine 1 Each Tablet, 1 EACH PO BID PRN for MIGRAINE, (

Reported)


Bupropion HCl 150 Mg Tablet.er, 150 MG PO DAILY, (Reported)


Cetirizine HCl 10 Mg Tablet, 10 MG PO DAILY, (Reported)


Cranberry Conc/Ascorbic Acid 1 Each Capsule, 2 EACH PO DAILY, (Reported)


Escitalopram Oxalate 20 Mg Tablet, 20 MG PO DAILY, (Reported)


Hydrochlorothiazide 25 Mg Tablet, 25 MG PO DAILY, (Reported)


Hydrocodone Bit/Acetaminophen 1 Each Tablet, 1 TAB PO TID PRN for PAIN, (

Reported)


Levothyroxine Sodium 25 Mcg Tablet, 25 MCG PO DAILY, (Reported)


Meloxicam 15 Mg Tablet, 7.5 MG PO DAILY, (Reported)


   take 1/2 of 15mg 


Metaxalone 800 Mg Tablet, 800 MG PO DAILY, (Reported)


Potassium Chloride 10 Meq Tablet.er, 20 MEQ PO DAILY, (Reported)


   TAKES 2 (10 MEQ) TABLETS 


Tolterodine Tartrate 2 Mg Tablet, 4 MG PO DAILY, (Reported)


   TAKES 2 (2MG) TABLETS 





Patient Home Medication List


Home Medication List Reviewed:  Yes











CONRAD QUACH MD May 22, 2018 2:28 pm

## 2018-05-22 NOTE — DISCHARGE INST-SIMPLE/STANDARD
Discharge Inst-Standard


Discharge Medications


New, Converted or Re-Newed RX:  RX on Chart





Patient Instructions/Follow Up


Plan of Care/Instructions/FU:  


Band-Aids off in  48 hours.  Incentive spirometry.  Follow-up in 4 weeks.


Abdominal binder to be used during the day.


Activity as Tolerated:  No


Goal:  


No lifting or pushing


Discharge Diet:  No Restrictions











CONRAD QUACH MD May 22, 2018 2:33 pm

## 2018-05-23 VITALS — SYSTOLIC BLOOD PRESSURE: 130 MMHG | DIASTOLIC BLOOD PRESSURE: 61 MMHG

## 2018-05-23 VITALS — SYSTOLIC BLOOD PRESSURE: 113 MMHG | DIASTOLIC BLOOD PRESSURE: 53 MMHG

## 2018-05-23 VITALS — SYSTOLIC BLOOD PRESSURE: 140 MMHG | DIASTOLIC BLOOD PRESSURE: 72 MMHG

## 2018-05-23 VITALS — DIASTOLIC BLOOD PRESSURE: 63 MMHG | SYSTOLIC BLOOD PRESSURE: 135 MMHG

## 2018-05-23 VITALS — SYSTOLIC BLOOD PRESSURE: 136 MMHG | DIASTOLIC BLOOD PRESSURE: 69 MMHG

## 2018-05-23 VITALS — DIASTOLIC BLOOD PRESSURE: 59 MMHG | SYSTOLIC BLOOD PRESSURE: 115 MMHG

## 2018-05-23 RX ADMIN — SODIUM CHLORIDE, SODIUM LACTATE, POTASSIUM CHLORIDE, AND CALCIUM CHLORIDE SCH MLS/HR: 600; 310; 30; 20 INJECTION, SOLUTION INTRAVENOUS at 01:34

## 2018-05-23 RX ADMIN — FENTANYL CITRATE PRN MCG: 50 INJECTION, SOLUTION INTRAMUSCULAR; INTRAVENOUS at 04:50

## 2018-05-23 RX ADMIN — HYDROCODONE BITARTRATE AND ACETAMINOPHEN PRN EA: 7.5; 325 TABLET ORAL at 19:56

## 2018-05-23 NOTE — ANESTHESIA-GENERAL POST-OP
General


Patient Condition


Mental Status/LOC:  Same as Preop


Cardiovascular:  Satisfactory


Nausea/Vomiting:  Absent


Respiratory:  Satisfactory


Pain:  Controlled


Complications:  Absent





Post Op Complications


Complications


None





Follow Up Care/Instructions


Patient Instructions


None needed.





Anesthesia/Patient Condition


Patient Condition


Patient is doing well, no complaints, stable vital signs, no apparent adverse 

anesthesia problems.   


No complications reported per nursing.


D/C home per [facility] Criteria:  Yes











MAGALIS WILKINSON CRNA May 23, 2018 06:52

## 2018-05-23 NOTE — PROGRESS NOTE-STANDARD
Standard Progress Note


Progress Notes/Assess & Plan


Date Seen by Provider:  May 23, 2018


Time Seen by Provider:  11:00


Progress/Assessment & Plan


5/23/18:pain control inadequate.  Choices of medications limited due to various 

allergies.  Vital signs stable.  Incisions dry.  Requires help even to sit up 

and get out of bed.  She would benefit from IV analgesics and another night in 

the hospital.


Final Diagnosis


recurrent ventral hernia











CONRAD QUACH MD May 23, 2018 11:01

## 2018-05-24 VITALS — SYSTOLIC BLOOD PRESSURE: 107 MMHG | DIASTOLIC BLOOD PRESSURE: 53 MMHG

## 2018-05-24 VITALS — DIASTOLIC BLOOD PRESSURE: 65 MMHG | SYSTOLIC BLOOD PRESSURE: 115 MMHG

## 2018-05-24 VITALS — SYSTOLIC BLOOD PRESSURE: 118 MMHG | DIASTOLIC BLOOD PRESSURE: 63 MMHG

## 2018-05-24 RX ADMIN — HYDROCODONE BITARTRATE AND ACETAMINOPHEN PRN EA: 7.5; 325 TABLET ORAL at 10:57

## 2018-05-24 RX ADMIN — HYDROCODONE BITARTRATE AND ACETAMINOPHEN PRN EA: 7.5; 325 TABLET ORAL at 06:58

## 2018-05-24 NOTE — DISCHARGE INST-SIMPLE/STANDARD
Discharge Inst-Standard


Discharge Medications


New, Converted or Re-Newed RX:  RX on Chart





Patient Instructions/Follow Up


Plan of Care/Instructions/FU:  


F/U in 4 weeks. To wear the binder for 3 weeks. Incentive spirometry for


10 days


Activity as Tolerated:  No


Goal:  


No lifting over 10 lb


Discharge Diet:  No Restrictions











CONRAD QUACH MD May 24, 2018 09:17

## 2022-05-25 ENCOUNTER — HOSPITAL ENCOUNTER (OUTPATIENT)
Dept: HOSPITAL 75 - RAD | Age: 67
End: 2022-05-25
Attending: NURSE PRACTITIONER
Payer: MEDICARE

## 2022-05-25 DIAGNOSIS — M51.37: Primary | ICD-10-CM

## 2022-05-25 DIAGNOSIS — M48.061: ICD-10-CM

## 2022-05-25 DIAGNOSIS — M51.27: ICD-10-CM

## 2022-05-25 DIAGNOSIS — M47.816: ICD-10-CM

## 2022-05-25 PROCEDURE — 72148 MRI LUMBAR SPINE W/O DYE: CPT

## 2022-05-25 NOTE — DIAGNOSTIC IMAGING REPORT
CLINICAL INDICATION: Patient leaned over about three months ago

and has had low back pain ever since.



EXAM: MRI of the lumbar spine performed without IV contrast.

Sagittal T2, sagittal T1, sagittal STIR, axial T1, and axial T2.



COMPARISON: MRI of the lumbar spine without contrast dated

08/05/2014.



FINDINGS:

There is no acute lumbar spine fracture or dislocation. There are

degenerative spurs involving the lumbar spine and lower lumbar

spine facet arthropathy. The visualized portions of the distal

thoracic spinal cord, conus medullaris, and cauda equina nerve

roots are unremarkable. The conus medullaris tip is seen at the

upper L1 vertebral body level. There is no significant paraspinal

soft tissue abnormality.



Stable small perineural cyst at the left T12-L1 level.



L1-L2: There is no significant central canal or neural foramen

narrowing.



L2-L3: There is mild diffuse disc bulge with slight decreased

size of the right paracentral disc bulge component. There is mild

central canal narrowing which has slightly improved. There is no

significant left neural foramen narrowing. There is moderate

right neural foramen narrowing which has progressed due to disc

bulge.



L3-L4: Again seen diffuse disc bulge and moderate bilateral facet

arthropathy/hypertrophy. There is decrease in the previously seen

ligamentum flavum buckling. There is mild-to-moderate central

canal stenosis which has slightly improved. Stable mild bilateral

neural foramen narrowing.



L4-L5: There is diffuse disc bulge with hypertrophic far lateral

disc spurs. There is moderate-to-severe loss of disc space

height. There is moderate bilateral facet arthropathy. There is

mild central canal stenosis. There is severe right neural foramen

narrowing and no significant left neural foramen narrowing.



L5-S1: There is interval progression of diffuse disc bulge with

moderate loss of disc space height. There is severe left neural

foramen narrowing. There is no significant right neural foramen

narrowing. There is no significant central canal stenosis. There

appears to be encroachment upon the non-exited bilateral S1 nerve

roots due to the disc bulge.



IMPRESSION:

There is multilevel lumbar spine degenerative disease, as

described above.



Dictated by: 



  Dictated on workstation # [id number]

## 2022-08-05 ENCOUNTER — HOSPITAL ENCOUNTER (OUTPATIENT)
Dept: HOSPITAL 75 - RAD | Age: 67
End: 2022-08-05
Payer: MEDICARE

## 2022-08-05 DIAGNOSIS — Z12.31: Primary | ICD-10-CM

## 2022-08-05 PROCEDURE — 77067 SCR MAMMO BI INCL CAD: CPT

## 2022-08-05 PROCEDURE — 77063 BREAST TOMOSYNTHESIS BI: CPT

## 2022-08-05 NOTE — DIAGNOSTIC IMAGING REPORT
Indication: Routine screening.



No prior mammograms are available for comparison.



2-D and 3-D bilateral screening mammography was performed with

CAD.



Both breasts are heterogeneously dense, limiting the sensitivity

of mammography. There is a nodular density in the outer right

breast mid depth best seen on the CC view. No definite correlate

on the MLO view is seen but this may be superiorly located.

Additional views are recommended for further evaluation. The left

breast is unremarkable. No malignant-appearing

microcalcifications are seen. Axillae are unremarkable.



IMPRESSION: BI-RADS 0



Right breast density. Additional views are recommended for

further evaluation.



ACR BI-RADS Category 0: Incomplete. (Needs additional imaging

evaluation).

Result letter will be mailed to the patient.

Note: At least 10% of breast cancer is not imaged by mammography.



Dictated by: 



  Dictated on workstation # [id number]

## 2023-05-17 ENCOUNTER — HOSPITAL ENCOUNTER (OUTPATIENT)
Dept: HOSPITAL 75 - PREOP | Age: 68
LOS: 1 days | Discharge: HOME | End: 2023-05-18
Attending: SURGERY
Payer: MEDICARE

## 2023-05-17 VITALS — BODY MASS INDEX: 32.43 KG/M2 | HEIGHT: 64.96 IN | WEIGHT: 194.67 LBS

## 2023-05-17 DIAGNOSIS — Z01.818: Primary | ICD-10-CM

## 2023-05-30 ENCOUNTER — HOSPITAL ENCOUNTER (OUTPATIENT)
Dept: HOSPITAL 75 - ENDO | Age: 68
Discharge: HOME | End: 2023-05-30
Attending: SURGERY
Payer: MEDICARE

## 2023-05-30 VITALS — DIASTOLIC BLOOD PRESSURE: 43 MMHG | SYSTOLIC BLOOD PRESSURE: 73 MMHG

## 2023-05-30 VITALS — SYSTOLIC BLOOD PRESSURE: 92 MMHG | DIASTOLIC BLOOD PRESSURE: 59 MMHG

## 2023-05-30 VITALS — SYSTOLIC BLOOD PRESSURE: 125 MMHG | DIASTOLIC BLOOD PRESSURE: 64 MMHG

## 2023-05-30 VITALS — SYSTOLIC BLOOD PRESSURE: 98 MMHG | DIASTOLIC BLOOD PRESSURE: 65 MMHG

## 2023-05-30 VITALS — BODY MASS INDEX: 32.43 KG/M2 | WEIGHT: 194.67 LBS | HEIGHT: 65 IN

## 2023-05-30 VITALS — SYSTOLIC BLOOD PRESSURE: 88 MMHG | DIASTOLIC BLOOD PRESSURE: 66 MMHG

## 2023-05-30 DIAGNOSIS — Z28.310: ICD-10-CM

## 2023-05-30 DIAGNOSIS — Z87.891: ICD-10-CM

## 2023-05-30 DIAGNOSIS — K92.1: Primary | ICD-10-CM

## 2023-05-30 DIAGNOSIS — K44.9: ICD-10-CM

## 2023-05-30 DIAGNOSIS — K21.00: ICD-10-CM

## 2023-05-30 DIAGNOSIS — K31.89: ICD-10-CM

## 2023-05-30 NOTE — PROGRESS NOTE-POST OPERATIVE
Post-Operative Progess Note


Surgeon (s)/Assistant (s)


Surgeon


DANILE TORRES DO


Assistant:  na





Pre-Operative Diagnosis


gerd, melena





Post-Operative Diagnosis





small hiatal hernia, normal colon





Procedure & Operative Findings


Date of Procedure


5/30/23


Procedure Performed/Findings


egd c biopsies, colonoscopy


Anesthesia Type


per [facility]





Estimated Blood Loss


Estimated blood loss (mL):  none





Specimens/Packing


Specimens Removed


antrum, ge











DANIEL TORRES DO              May 30, 2023 14:45

## 2023-05-30 NOTE — DISCHARGE INST-SIMPLE/STANDARD
Discharge Inst-Standard


Patient Instructions/Follow Up


Plan of Care/Instructions/FU:  


2 weeks Rosanna


Activity as Tolerated:  Yes


Discharge Diet:  Regular Diet











DANIEL TORRES DO              May 30, 2023 14:43

## 2023-05-30 NOTE — OPERATIVE REPORT
DATE OF SERVICE: 05/30/2023



PREOPERATIVE DIAGNOSES:

Gastroesophageal reflux disease, melena.



POSTOPERATIVE DIAGNOSES:

Small hiatal hernia, normal colon.



PROCEDURES:

EGD with biopsies, colonoscopy.



SURGEON:

Daniel Marte DO



ANESTHESIA:

Per [facility].



ESTIMATED BLOOD LOSS:

None.



COMPLICATIONS:

None.



SPECIMENS:

Antrum, GE junction.



INDICATIONS:

The patient is a 67-year-old female with GERD, melanoma symptoms.  She 

understands risks and benefits of procedure and wished to proceed.  Consent was 

signed in chart.



DESCRIPTION OF PROCEDURE:

The patient was taken to endoscopy suite, placed in left lateral recumbent 

position.  Timeout was performed.  Scope was inserted in the mouth, down the 

esophagus, stomach, into the duodenum without difficulty.  No polyps, masses, or

ulcerations within the duodenum.  Scope was slowly retracted back into the 

stomach where it was further insufflated.  No polyps, masses or ulcerations.  

Biopsy of the antrum was obtained.  Scope was retroflexed noting a small hiatal 

hernia, no other pathology.  Scope was returned to its normal position, slowly 

withdrawn until distal esophagus.  Biopsy of GE junction was obtained.  No 

polyps, masses or ulcerations.  Scope was slowly retracted back until completely

removed.  The patient tolerated the procedure well. Digital rectal exam was 

performed.  No palpable polyps, masses or ulcerations.  Scope was then inserted 

in the rectum, advanced all the way to the cecum with minimal difficulty.  Prep 

was adequate.  Scope was then slowly retracted back.  No polyps, masses or 

ulcerations within the cecum, ascending, transverse, descending and sigmoid 

colon.  Once in the rectum, scope was retroflexed noting no other pathology.  

Prep was adequate.  Scope was retroflexed in the rectum, noting no other 

pathology.  Scope was returned to its normal position, slowly withdrawn until 

completely removed.  The patient tolerated the procedure well without 

complications, taken to recovery room in stable condition.



RECOMMENDATIONS:

The patient will need repeat colonoscopy in 10 years unless family history of 

colon cancer, which will then be 5 years.  If she has any return of symptoms, 

she should be reevaluated at that time.  Continue on current medications for her

reflux.  Await biopsy results.  She will follow up in 2 weeks.





Job ID: [id number]

DocumentID: [id number]

Dictated Date: 05/30/2023 14:47:06

Transcription Date: 05/30/2023 19:39:00

Dictated By: DANIEL MARTE DO

## 2023-05-30 NOTE — ANESTHESIA-GENERAL POST-OP
MAC


Patient Condition


Mental Status/LOC:  Same as Preop


Cardiovascular:  Satisfactory


Nausea/Vomiting:  Absent


Respiratory:  Satisfactory


Pain:  Controlled


Complications:  Absent





Post Op Complications


Complications


None





Follow Up Care/Instructions


Patient Instructions


None needed.





Anesthesiology Discharge Order


Discharge Order


Patient is doing well, no complaints, stable vital signs, no apparent adverse 

anesthesia problems.   


No complications reported per nursing.











JOSIE PERALES DO         May 30, 2023 14:51

## 2023-05-30 NOTE — PROGRESS NOTE-PRE OPERATIVE
Pre-Operative Progress Note


Date H&P Reviewed:  May 30, 2023


Time H&P Reviewed:  13:29


History & Physical:  H&P Reviewed, Patient Examed, No changes noted


Pre-Operative Diagnosis:  gerd, melena











DANIEL TORRES DO              May 30, 2023 13:29